# Patient Record
Sex: FEMALE | Race: BLACK OR AFRICAN AMERICAN | Employment: OTHER | URBAN - METROPOLITAN AREA
[De-identification: names, ages, dates, MRNs, and addresses within clinical notes are randomized per-mention and may not be internally consistent; named-entity substitution may affect disease eponyms.]

---

## 2019-12-22 ENCOUNTER — APPOINTMENT (OUTPATIENT)
Dept: GENERAL RADIOLOGY | Age: 69
DRG: 060 | End: 2019-12-22
Payer: MEDICARE

## 2019-12-22 ENCOUNTER — HOSPITAL ENCOUNTER (INPATIENT)
Age: 69
LOS: 3 days | Discharge: HOME HEALTH | DRG: 060 | End: 2019-12-25
Attending: EMERGENCY MEDICINE | Admitting: INTERNAL MEDICINE
Payer: MEDICARE

## 2019-12-22 PROBLEM — R29.898 BILATERAL LEG WEAKNESS: Status: ACTIVE | Noted: 2019-12-22

## 2019-12-22 LAB
A/G RATIO: 1.2 (ref 1.1–2.2)
ALBUMIN SERPL-MCNC: 4 G/DL (ref 3.4–5)
ALP BLD-CCNC: 80 U/L (ref 40–129)
ALT SERPL-CCNC: 10 U/L (ref 10–40)
ANION GAP SERPL CALCULATED.3IONS-SCNC: 15 MMOL/L (ref 3–16)
AST SERPL-CCNC: 19 U/L (ref 15–37)
BACTERIA: ABNORMAL /HPF
BASOPHILS ABSOLUTE: 0.1 K/UL (ref 0–0.2)
BASOPHILS RELATIVE PERCENT: 0.9 %
BILIRUB SERPL-MCNC: 0.4 MG/DL (ref 0–1)
BILIRUBIN URINE: NEGATIVE
BLOOD, URINE: ABNORMAL
BUN BLDV-MCNC: 14 MG/DL (ref 7–20)
CALCIUM SERPL-MCNC: 9 MG/DL (ref 8.3–10.6)
CHLORIDE BLD-SCNC: 101 MMOL/L (ref 99–110)
CLARITY: ABNORMAL
CO2: 24 MMOL/L (ref 21–32)
COLOR: YELLOW
CREAT SERPL-MCNC: 1 MG/DL (ref 0.6–1.2)
EOSINOPHILS ABSOLUTE: 0.1 K/UL (ref 0–0.6)
EOSINOPHILS RELATIVE PERCENT: 1.4 %
EPITHELIAL CELLS, UA: 3 /HPF (ref 0–5)
GFR AFRICAN AMERICAN: >60
GFR NON-AFRICAN AMERICAN: 55
GLOBULIN: 3.3 G/DL
GLUCOSE BLD-MCNC: 88 MG/DL (ref 70–99)
GLUCOSE URINE: NEGATIVE MG/DL
HCT VFR BLD CALC: 32.4 % (ref 36–48)
HEMOGLOBIN: 10.7 G/DL (ref 12–16)
HYALINE CASTS: 0 /LPF (ref 0–8)
KETONES, URINE: NEGATIVE MG/DL
LEUKOCYTE ESTERASE, URINE: ABNORMAL
LYMPHOCYTES ABSOLUTE: 2.3 K/UL (ref 1–5.1)
LYMPHOCYTES RELATIVE PERCENT: 30.6 %
MCH RBC QN AUTO: 28.8 PG (ref 26–34)
MCHC RBC AUTO-ENTMCNC: 32.9 G/DL (ref 31–36)
MCV RBC AUTO: 87.6 FL (ref 80–100)
MICROSCOPIC EXAMINATION: YES
MONOCYTES ABSOLUTE: 0.5 K/UL (ref 0–1.3)
MONOCYTES RELATIVE PERCENT: 7.3 %
NEUTROPHILS ABSOLUTE: 4.4 K/UL (ref 1.7–7.7)
NEUTROPHILS RELATIVE PERCENT: 59.8 %
NITRITE, URINE: POSITIVE
PDW BLD-RTO: 14.8 % (ref 12.4–15.4)
PH UA: 5.5 (ref 5–8)
PLATELET # BLD: 211 K/UL (ref 135–450)
PMV BLD AUTO: 8 FL (ref 5–10.5)
POTASSIUM REFLEX MAGNESIUM: 4.7 MMOL/L (ref 3.5–5.1)
PROTEIN UA: NEGATIVE MG/DL
RBC # BLD: 3.7 M/UL (ref 4–5.2)
RBC UA: 2 /HPF (ref 0–4)
SODIUM BLD-SCNC: 140 MMOL/L (ref 136–145)
SPECIFIC GRAVITY UA: 1.02 (ref 1–1.03)
TOTAL PROTEIN: 7.3 G/DL (ref 6.4–8.2)
TROPONIN: <0.01 NG/ML
TSH REFLEX: 1.21 UIU/ML (ref 0.27–4.2)
URINE REFLEX TO CULTURE: YES
URINE TYPE: ABNORMAL
UROBILINOGEN, URINE: 0.2 E.U./DL
WBC # BLD: 7.4 K/UL (ref 4–11)
WBC UA: 34 /HPF (ref 0–5)

## 2019-12-22 PROCEDURE — 2580000003 HC RX 258: Performed by: INTERNAL MEDICINE

## 2019-12-22 PROCEDURE — 71046 X-RAY EXAM CHEST 2 VIEWS: CPT

## 2019-12-22 PROCEDURE — 81001 URINALYSIS AUTO W/SCOPE: CPT

## 2019-12-22 PROCEDURE — 87086 URINE CULTURE/COLONY COUNT: CPT

## 2019-12-22 PROCEDURE — 1200000000 HC SEMI PRIVATE

## 2019-12-22 PROCEDURE — 80053 COMPREHEN METABOLIC PANEL: CPT

## 2019-12-22 PROCEDURE — 6360000002 HC RX W HCPCS: Performed by: INTERNAL MEDICINE

## 2019-12-22 PROCEDURE — 99285 EMERGENCY DEPT VISIT HI MDM: CPT

## 2019-12-22 PROCEDURE — 96374 THER/PROPH/DIAG INJ IV PUSH: CPT

## 2019-12-22 PROCEDURE — 84443 ASSAY THYROID STIM HORMONE: CPT

## 2019-12-22 PROCEDURE — 85025 COMPLETE CBC W/AUTO DIFF WBC: CPT

## 2019-12-22 PROCEDURE — 6360000002 HC RX W HCPCS: Performed by: EMERGENCY MEDICINE

## 2019-12-22 PROCEDURE — 84484 ASSAY OF TROPONIN QUANT: CPT

## 2019-12-22 PROCEDURE — 93005 ELECTROCARDIOGRAM TRACING: CPT | Performed by: EMERGENCY MEDICINE

## 2019-12-22 PROCEDURE — 6370000000 HC RX 637 (ALT 250 FOR IP): Performed by: INTERNAL MEDICINE

## 2019-12-22 RX ORDER — LISINOPRIL AND HYDROCHLOROTHIAZIDE 20; 12.5 MG/1; MG/1
1 TABLET ORAL DAILY
COMMUNITY

## 2019-12-22 RX ORDER — ACETAMINOPHEN 325 MG/1
650 TABLET ORAL EVERY 4 HOURS PRN
Status: DISCONTINUED | OUTPATIENT
Start: 2019-12-22 | End: 2019-12-25 | Stop reason: HOSPADM

## 2019-12-22 RX ORDER — AMLODIPINE BESYLATE 5 MG/1
5 TABLET ORAL DAILY
COMMUNITY
End: 2020-07-27

## 2019-12-22 RX ORDER — FLUOXETINE HYDROCHLORIDE 20 MG/1
20 CAPSULE ORAL DAILY
COMMUNITY

## 2019-12-22 RX ORDER — LISINOPRIL 5 MG/1
5 TABLET ORAL DAILY
Status: DISCONTINUED | OUTPATIENT
Start: 2019-12-23 | End: 2019-12-22

## 2019-12-22 RX ORDER — BACLOFEN 10 MG/1
10 TABLET ORAL 3 TIMES DAILY PRN
Status: DISCONTINUED | OUTPATIENT
Start: 2019-12-22 | End: 2019-12-25 | Stop reason: HOSPADM

## 2019-12-22 RX ORDER — SODIUM CHLORIDE 0.9 % (FLUSH) 0.9 %
10 SYRINGE (ML) INJECTION EVERY 12 HOURS SCHEDULED
Status: DISCONTINUED | OUTPATIENT
Start: 2019-12-22 | End: 2019-12-25 | Stop reason: HOSPADM

## 2019-12-22 RX ORDER — HYDROCODONE BITARTRATE AND ACETAMINOPHEN 5; 325 MG/1; MG/1
1 TABLET ORAL EVERY 6 HOURS PRN
Status: DISCONTINUED | OUTPATIENT
Start: 2019-12-22 | End: 2019-12-22

## 2019-12-22 RX ORDER — ONDANSETRON 2 MG/ML
4 INJECTION INTRAMUSCULAR; INTRAVENOUS EVERY 6 HOURS PRN
Status: DISCONTINUED | OUTPATIENT
Start: 2019-12-22 | End: 2019-12-25 | Stop reason: HOSPADM

## 2019-12-22 RX ORDER — METHYLPREDNISOLONE SODIUM SUCCINATE 125 MG/2ML
125 INJECTION, POWDER, LYOPHILIZED, FOR SOLUTION INTRAMUSCULAR; INTRAVENOUS ONCE
Status: COMPLETED | OUTPATIENT
Start: 2019-12-22 | End: 2019-12-22

## 2019-12-22 RX ORDER — PRAVASTATIN SODIUM 40 MG
40 TABLET ORAL DAILY
COMMUNITY

## 2019-12-22 RX ORDER — BACLOFEN 10 MG/1
10 TABLET ORAL DAILY
Status: DISCONTINUED | OUTPATIENT
Start: 2019-12-23 | End: 2019-12-22

## 2019-12-22 RX ORDER — DICLOFENAC SODIUM 75 MG/1
75 TABLET, DELAYED RELEASE ORAL 2 TIMES DAILY
Status: DISCONTINUED | OUTPATIENT
Start: 2019-12-22 | End: 2019-12-22

## 2019-12-22 RX ORDER — SODIUM CHLORIDE 0.9 % (FLUSH) 0.9 %
10 SYRINGE (ML) INJECTION PRN
Status: DISCONTINUED | OUTPATIENT
Start: 2019-12-22 | End: 2019-12-25 | Stop reason: HOSPADM

## 2019-12-22 RX ORDER — LISINOPRIL AND HYDROCHLOROTHIAZIDE 20; 12.5 MG/1; MG/1
1 TABLET ORAL DAILY
Status: DISCONTINUED | OUTPATIENT
Start: 2019-12-22 | End: 2019-12-25 | Stop reason: HOSPADM

## 2019-12-22 RX ADMIN — LISINOPRIL AND HYDROCHLOROTHIAZIDE 1 TABLET: 12.5; 2 TABLET ORAL at 22:04

## 2019-12-22 RX ADMIN — METHYLPREDNISOLONE SODIUM SUCCINATE 125 MG: 125 INJECTION, POWDER, FOR SOLUTION INTRAMUSCULAR; INTRAVENOUS at 18:01

## 2019-12-22 RX ADMIN — SODIUM CHLORIDE 375 MG: 9 INJECTION, SOLUTION INTRAVENOUS at 22:04

## 2019-12-22 RX ADMIN — Medication 10 ML: at 22:05

## 2019-12-22 RX ADMIN — CEFTRIAXONE 1 G: 1 INJECTION, POWDER, FOR SOLUTION INTRAMUSCULAR; INTRAVENOUS at 22:30

## 2019-12-22 RX ADMIN — ENOXAPARIN SODIUM 40 MG: 40 INJECTION SUBCUTANEOUS at 22:04

## 2019-12-22 ASSESSMENT — ENCOUNTER SYMPTOMS
SORE THROAT: 0
RHINORRHEA: 0
SHORTNESS OF BREATH: 0
ABDOMINAL PAIN: 0
EYE REDNESS: 0

## 2019-12-22 ASSESSMENT — PAIN SCALES - GENERAL: PAINLEVEL_OUTOF10: 0

## 2019-12-22 NOTE — ED PROVIDER NOTES
[facility]  eMERGENCY dEPARTMENT eNCOUnter      Pt Name: Jesse Rick  MRN: [id number]  [place] 1950  Date of evaluation: 12/22/2019  Provider: Merlin Barrette, MD    CHIEF COMPLAINT       Chief Complaint   Patient presents with    Extremity Weakness     states she has had bilateral weakness since last week getting worse since friday         HISTORY OF PRESENT ILLNESS   (Location/Symptom, Timing/Onset,Context/Setting, Quality, Duration, Modifying Factors, Severity)  Note limiting factors. Jesse Rick is a 77 y.o. female who presents to the emergency department complaint of bilateral lower extremities weakness that has been worsening for the past 9 days. She has a past medical history of multiple sclerosis. She reports that she really does not have anyone who manages that and she is not on any medications for it. She denies any fever or chills. No history of trauma. No history of pain. No history of chest pain or shortness of breath. No history of blood in her stool. No history of dehydration. No history of UTI symptoms. She states she just been getting more weak and now she can only walk a few feet before she has to stop because she just does not have the strength to walk. HPI    NursingNotes were reviewed. REVIEW OF SYSTEMS    (2-9 systems for level 4, 10 or more for level 5)     Review of Systems   Constitutional: Positive for fatigue. Negative for fever. HENT: Negative for rhinorrhea and sore throat. Eyes: Negative for redness. Respiratory: Negative for shortness of breath. Cardiovascular: Negative for chest pain. Gastrointestinal: Negative for abdominal pain. Genitourinary: Negative for flank pain. Musculoskeletal: Negative for joint swelling. Skin: Negative for rash. Neurological: Negative for headaches. Hematological: Negative for adenopathy. Psychiatric/Behavioral: Negative for confusion.        Except as of club or organization: None     Attends meetings of clubs or organizations: None     Relationship status: None    Intimate partner violence:     Fear of current or ex partner: None     Emotionally abused: None     Physically abused: None     Forced sexual activity: None   Other Topics Concern    None   Social History Narrative    None       SCREENINGS             PHYSICAL EXAM    (up to 7 for level 4, 8 or more for level 5)     ED Triage Vitals [12/22/19 1503]   BP Temp Temp Source Pulse Resp SpO2 Height Weight   (!) 146/85 97.4 °F (36.3 °C) Oral 59 18 95 % 5' 4.5\" (1.638 m) 220 lb 3.8 oz (99.9 kg)       Physical Exam  Vitals signs and nursing note reviewed. Constitutional:       Appearance: She is well-developed. She is not diaphoretic. HENT:      Head: Normocephalic and atraumatic. Eyes:      General:         Right eye: No discharge. Left eye: No discharge. Conjunctiva/sclera: Conjunctivae normal.   Neck:      Musculoskeletal: Neck supple. Cardiovascular:      Rate and Rhythm: Normal rate. Pulses: Normal pulses. Heart sounds: No murmur. Pulmonary:      Effort: Pulmonary effort is normal. No respiratory distress. Breath sounds: Normal breath sounds. No stridor. No wheezing. Abdominal:      General: Abdomen is flat. There is no distension. Palpations: Abdomen is soft. Tenderness: There is no tenderness. Musculoskeletal: Normal range of motion. Skin:     General: Skin is warm and dry. Capillary Refill: Capillary refill takes less than 2 seconds. Neurological:      Mental Status: She is alert and oriented to person, place, and time. GCS: GCS eye subscore is 4. GCS verbal subscore is 5. GCS motor subscore is 6. Comments: Normal speech. Normal thought. Oriented x3.    Psychiatric:         Behavior: Behavior normal.         DIAGNOSTIC RESULTS     EKG: All EKG's are interpreted by the Emergency Department Physician who either signs or Co-signsthis chart in the absence of a cardiologist.    The Ekg interpreted by me shows  sinus bradycardia, rate=57   Axis is   48  QTc is  normal  Intervals and Durations are unremarkable. ST Segments: nonspecific changes  No significant change from prior EKG dated 5/13/14          RADIOLOGY:   Non-plain filmimages such as CT, Ultrasound and MRI are read by the radiologist. Plain radiographic images are visualized and preliminarily interpreted by the emergency physician with the below findings:        Interpretation per the Radiologist below, if available at the time ofthis note:    XR CHEST STANDARD (2 VW)   Final Result   No acute abnormality identified. ED BEDSIDE ULTRASOUND:   Performed by ED Physician - none    LABS:  Labs Reviewed   CBC WITH AUTO DIFFERENTIAL - Abnormal; Notable for the following components:       Result Value    RBC 3.70 (*)     Hemoglobin 10.7 (*)     Hematocrit 32.4 (*)     All other components within normal limits    Narrative:     Performed at:  [facility]  [address], [place] [address]   Phone [phone]   COMPREHENSIVE METABOLIC PANEL W/ REFLEX TO MG FOR LOW K - Abnormal; Notable for the following components:    GFR Non- 55 (*)     All other components within normal limits    Narrative:     Performed at:  [facility]  [address], [place] [address]   Phone [phone]   TROPONIN    Narrative:     Performed at:  [facility]  [address], [place] [address]   Phone [phone]   TSH WITH REFLEX    Narrative:     Performed at:  [facility]  [address], [place] [facility] [employer] [place] [address]   Phone [phone]   URINE RT REFLEX TO CULTURE       All other labs were within normal range or not returned as of this dictation.     EMERGENCY DEPARTMENT COURSE and DIFFERENTIAL DIAGNOSIS/MDM:

## 2019-12-22 NOTE — H&P
Hospital Medicine History and Physical    12/22/2019    Date of Admission: 12/22/2019    Date of Service: Pt seen/examined on 12/22/2019 and admitted to inpatient. Assessment:  1. Gait difficulty/difficulty ambulation and patient with history of MS.  2. Abnormal urinalysis, although no symptoms. R/o urinary tract infection. 3. Other comorbidities: Essential hypertension. Plan:  1. Receiving total Solu-Medrol 500 mg IV in the emergency room. 2. Start rocephin. Follow urine culture result. Await neuro eval.  3. Will obtain MRI-brain w/ and w/o contrastg. PT/OT. Fall precautions. Activities: Up with assist  Prophylaxis: Subcutaneous Lovenox  Code status: Full code    ==========================================================  Chief complaint:  Chief Complaint   Patient presents with    Extremity Weakness     states she has had bilateral weakness since last week getting worse since friday     History of Presenting Illness: This is a pleasant 77 y.o. female with history of MS, essential hypertension, who presents to the emergency room with report of bilateral lower extremity weakness that has been ongoing for the past week, worsened over the last couple of days. No clear urinary symptoms or fever or chills or chest pain. She reports right great toe not moving as much. She does not seem to have any focal deficits otherwise. Past Medical History:      Diagnosis Date    Hypertension     MS (multiple sclerosis) ([facility] [address]        Past Surgical History:      Procedure Laterality Date    APPENDECTOMY      CHOLECYSTECTOMY      HYSTERECTOMY      KNEE SURGERY      left    LUMBAR FUSION         Medications (prior to admission):  Prior to Admission medications    Medication Sig Start Date End Date Taking? Authorizing Provider   HYDROcodone-acetaminophen (NORCO) 5-325 MG per tablet Take 1 tablet by mouth every 6 hours as needed for Pain.  2/15/15   JAX Carr   diclofenac (VOLTAREN) 75 MG EC tablet Take 1 tablet by mouth 2 times daily. 8/27/14   Freda Palmer MD   baclofen (LIORESAL) 10 MG tablet Take 10 mg by mouth daily. Historical Provider, MD   lisinopril (PRINIVIL) 10 MG tablet Take 0.5 tablets by mouth daily. 5/14/14   Pierce Jain MD   ibuprofen (ADVIL) 200 MG tablet Take 1 tablet by mouth every 8 hours as needed for Pain (Headache). 5/14/14   Pierce Jain MD       Allergy(ies):  Patient has no known allergies. Social History:  TOBACCO:  reports that she has quit smoking. Her smoking use included cigarettes. She smoked 0.14 packs per day. She has never used smokeless tobacco.  ETOH:  reports current alcohol use. Family History:  History reviewed. No pertinent family history. Review of Systems:  Pertinent positives are listed in HPI. At least 10-point ROS reviewed and were negative. Vitals and physical examination:  BP (!) 146/85   Pulse 59   Temp 97.4 °F (36.3 °C) (Oral)   Resp 18   Ht 5' 4.5\" (1.638 m)   Wt 220 lb 3.8 oz (99.9 kg)   SpO2 95%   BMI 37.22 kg/m²   Gen/overall appearance: Not in acute distress. Alert. Head: Normocephalic, atraumatic  Eyes: EOMI, good acuity  ENT: Oral mucosa moist  Neck: No JVD, thyromegaly  CVS: Nml S1S2, no MRG, RRR  Pulm: Clear bilaterally. No crackles/wheezes  Gastrointestinal: Soft, NT/ND, +BS  Musculoskeletal: No edema. Warm  Neuro: No focal deficit. Moves extremity spontaneously. Psychiatry: Appropriate affect. Not agitated. Skin: Warm, dry with normal turgor.  No rash  Capillary refill: Brisk,< 3 seconds   Peripheral Pulses: +2 palpable, equal bilaterally      Labs/imaging/EKG:  CBC:   Recent Labs     12/22/19  1546   WBC 7.4   HGB 10.7*        BMP:    Recent Labs     12/22/19  1546      K 4.7      CO2 24   BUN 14   CREATININE 1.0   GLUCOSE 88     Hepatic:   Recent Labs     12/22/19  1546   AST 19   ALT 10   BILITOT 0.4   ALKPHOS 80       Xr Chest Standard (2 Vw)    Result Date: 12/22/2019  EXAMINATION: TWO XRAY VIEWS OF THE CHEST 12/22/2019 12:12 pm COMPARISON: 05/13/2014 HISTORY: ORDERING SYSTEM PROVIDED HISTORY: weakness TECHNOLOGIST PROVIDED HISTORY: Reason for exam:->weakness Reason for Exam: weakness Acuity: Acute Type of Exam: Initial Additional signs and symptoms: cannot walk in a straight line for 10 days FINDINGS: Cardial pericardial silhouette is stable. No acute focal infiltrate is identified. There is evidence of old granulomatous disease, stable. No pneumothorax is found. No free air. No acute bony abnormality. No acute abnormality identified. EKG: Sinus bradycardia, 57 bpm.  No acute ST/T changes. I reviewed EKG. Discussed with ER provider.       Thank you Shirley Keller for the opportunity to be involved in this patient's care.    -----------------------------  Kameron Greenfield MD  [facility] hospitalist

## 2019-12-23 ENCOUNTER — APPOINTMENT (OUTPATIENT)
Dept: MRI IMAGING | Age: 69
DRG: 060 | End: 2019-12-23
Payer: MEDICARE

## 2019-12-23 LAB
A/G RATIO: 1.4 (ref 1.1–2.2)
ALBUMIN SERPL-MCNC: 4.2 G/DL (ref 3.4–5)
ALP BLD-CCNC: 84 U/L (ref 40–129)
ALT SERPL-CCNC: 7 U/L (ref 10–40)
ANION GAP SERPL CALCULATED.3IONS-SCNC: 18 MMOL/L (ref 3–16)
AST SERPL-CCNC: 11 U/L (ref 15–37)
BASOPHILS ABSOLUTE: 0 K/UL (ref 0–0.2)
BASOPHILS RELATIVE PERCENT: 0.1 %
BILIRUB SERPL-MCNC: 0.3 MG/DL (ref 0–1)
BUN BLDV-MCNC: 17 MG/DL (ref 7–20)
CALCIUM SERPL-MCNC: 9.5 MG/DL (ref 8.3–10.6)
CHLORIDE BLD-SCNC: 100 MMOL/L (ref 99–110)
CO2: 20 MMOL/L (ref 21–32)
CREAT SERPL-MCNC: 0.9 MG/DL (ref 0.6–1.2)
EKG ATRIAL RATE: 57 BPM
EKG DIAGNOSIS: NORMAL
EKG P AXIS: 48 DEGREES
EKG P-R INTERVAL: 156 MS
EKG Q-T INTERVAL: 448 MS
EKG QRS DURATION: 90 MS
EKG QTC CALCULATION (BAZETT): 436 MS
EKG R AXIS: 9 DEGREES
EKG T AXIS: 36 DEGREES
EKG VENTRICULAR RATE: 57 BPM
EOSINOPHILS ABSOLUTE: 0 K/UL (ref 0–0.6)
EOSINOPHILS RELATIVE PERCENT: 0 %
GFR AFRICAN AMERICAN: >60
GFR NON-AFRICAN AMERICAN: >60
GLOBULIN: 3.1 G/DL
GLUCOSE BLD-MCNC: 135 MG/DL (ref 70–99)
HCT VFR BLD CALC: 39.5 % (ref 36–48)
HEMOGLOBIN: 12.8 G/DL (ref 12–16)
LYMPHOCYTES ABSOLUTE: 1 K/UL (ref 1–5.1)
LYMPHOCYTES RELATIVE PERCENT: 19.1 %
MCH RBC QN AUTO: 28.7 PG (ref 26–34)
MCHC RBC AUTO-ENTMCNC: 32.3 G/DL (ref 31–36)
MCV RBC AUTO: 88.9 FL (ref 80–100)
MONOCYTES ABSOLUTE: 0 K/UL (ref 0–1.3)
MONOCYTES RELATIVE PERCENT: 0.8 %
NEUTROPHILS ABSOLUTE: 4.4 K/UL (ref 1.7–7.7)
NEUTROPHILS RELATIVE PERCENT: 80 %
PDW BLD-RTO: 14.6 % (ref 12.4–15.4)
PLATELET # BLD: 219 K/UL (ref 135–450)
PMV BLD AUTO: 7.8 FL (ref 5–10.5)
POTASSIUM REFLEX MAGNESIUM: 3.7 MMOL/L (ref 3.5–5.1)
RBC # BLD: 4.44 M/UL (ref 4–5.2)
SODIUM BLD-SCNC: 138 MMOL/L (ref 136–145)
TOTAL PROTEIN: 7.3 G/DL (ref 6.4–8.2)
WBC # BLD: 5.4 K/UL (ref 4–11)

## 2019-12-23 PROCEDURE — 97530 THERAPEUTIC ACTIVITIES: CPT

## 2019-12-23 PROCEDURE — 6370000000 HC RX 637 (ALT 250 FOR IP): Performed by: INTERNAL MEDICINE

## 2019-12-23 PROCEDURE — 85025 COMPLETE CBC W/AUTO DIFF WBC: CPT

## 2019-12-23 PROCEDURE — 6360000002 HC RX W HCPCS: Performed by: INTERNAL MEDICINE

## 2019-12-23 PROCEDURE — 36415 COLL VENOUS BLD VENIPUNCTURE: CPT

## 2019-12-23 PROCEDURE — 80053 COMPREHEN METABOLIC PANEL: CPT

## 2019-12-23 PROCEDURE — 97116 GAIT TRAINING THERAPY: CPT

## 2019-12-23 PROCEDURE — 72156 MRI NECK SPINE W/O & W/DYE: CPT

## 2019-12-23 PROCEDURE — 70553 MRI BRAIN STEM W/O & W/DYE: CPT

## 2019-12-23 PROCEDURE — 6360000002 HC RX W HCPCS: Performed by: NURSE PRACTITIONER

## 2019-12-23 PROCEDURE — 94760 N-INVAS EAR/PLS OXIMETRY 1: CPT

## 2019-12-23 PROCEDURE — 97166 OT EVAL MOD COMPLEX 45 MIN: CPT

## 2019-12-23 PROCEDURE — 6360000004 HC RX CONTRAST MEDICATION: Performed by: NURSE PRACTITIONER

## 2019-12-23 PROCEDURE — A9577 INJ MULTIHANCE: HCPCS | Performed by: NURSE PRACTITIONER

## 2019-12-23 PROCEDURE — 1200000000 HC SEMI PRIVATE

## 2019-12-23 PROCEDURE — 2580000003 HC RX 258: Performed by: INTERNAL MEDICINE

## 2019-12-23 PROCEDURE — 72157 MRI CHEST SPINE W/O & W/DYE: CPT

## 2019-12-23 PROCEDURE — 93010 ELECTROCARDIOGRAM REPORT: CPT | Performed by: INTERNAL MEDICINE

## 2019-12-23 PROCEDURE — 97162 PT EVAL MOD COMPLEX 30 MIN: CPT

## 2019-12-23 PROCEDURE — 97535 SELF CARE MNGMENT TRAINING: CPT

## 2019-12-23 RX ORDER — LORAZEPAM 2 MG/ML
0.5 INJECTION INTRAMUSCULAR ONCE
Status: COMPLETED | OUTPATIENT
Start: 2019-12-23 | End: 2019-12-23

## 2019-12-23 RX ADMIN — GADOBENATE DIMEGLUMINE 20 ML: 529 INJECTION, SOLUTION INTRAVENOUS at 20:02

## 2019-12-23 RX ADMIN — ENOXAPARIN SODIUM 40 MG: 40 INJECTION SUBCUTANEOUS at 21:46

## 2019-12-23 RX ADMIN — LISINOPRIL AND HYDROCHLOROTHIAZIDE 1 TABLET: 12.5; 2 TABLET ORAL at 08:18

## 2019-12-23 RX ADMIN — CEFTRIAXONE 1 G: 1 INJECTION, POWDER, FOR SOLUTION INTRAMUSCULAR; INTRAVENOUS at 21:46

## 2019-12-23 RX ADMIN — LORAZEPAM 0.5 MG: 2 INJECTION INTRAMUSCULAR; INTRAVENOUS at 17:28

## 2019-12-23 RX ADMIN — Medication 10 ML: at 21:47

## 2019-12-23 RX ADMIN — Medication 10 ML: at 08:18

## 2019-12-23 ASSESSMENT — PAIN SCALES - GENERAL
PAINLEVEL_OUTOF10: 0

## 2019-12-23 NOTE — PROGRESS NOTES
opposition to each digit, and rapid alternating movements. Bed mobility  Comment: NT-pt OOB to recliner at start/end of session      Transfers  Sit to stand: Contact guard assistance  Stand to sit: Contact guard assistance     Vision - Basic Assessment  Prior Vision: Wears glasses only for reading  Vision Comments: per pt report may have glaucoma     Cognition  Overall Cognitive Status: WFL     Sensation  Overall Sensation Status: Impaired  Additional Comments: the pt reports fingertips of R hand are tingly and she cannot feel her R foot     LUE AROM (degrees)  LUE AROM : WFL  RUE AROM (degrees)  RUE AROM : WFL  RUE General AROM: shoulder flexion WFL but lacks end range, WFL distally     LUE Strength  Gross LUE Strength: WFL  RUE Strength  Gross RUE Strength: Exceptions to [facility]  R Shoulder Flex: 3+/5  R Elbow Flex: 4-/5  R Hand General: 4-/5                   Plan   Plan  Times per week: 3-5  Times per day: Daily  Current Treatment Recommendations: Balance Training, Functional Mobility Training, Endurance Training, Strengthening, ROM, Neuromuscular Re-education, Self-Care / ADL, Safety Education & Training      AM-PAC Score        -[facility] Inpatient Daily Activity Raw Score: 18 (12/23/19 1055)  AM-PAC Inpatient ADL T-Scale Score : 38.66 (12/23/19 1055)  ADL Inpatient CMS 0-100% Score: 46.65 (12/23/19 1055)  ADL Inpatient CMS G-Code Modifier : CK (12/23/19 1055)    Goals  Short term goals  Time Frame for Short term goals: Prior to d/c:  Short term goal 1: Pt will bathe with supervision. Short term goal 2: Pt will dress with supervision. Short term goal 3: Pt will toilet with supervision. Short term goal 4: Pt will complete fxl mobility and fxl transfers to/from ADL surfaces with supervision using AD. Short term goal 5: Pt will tolerate standing >5 minutes for functional task with supervision. Long term goals  Time Frame for Long term goals : STG=LTG  Patient Goals   Patient goals : to return home. Therapy Time   Individual Concurrent Group Co-treatment   Time In 1005         Time Out 1050         Minutes 45         Timed Code Treatment Minutes: 30 Minutes     This note to serve as OT d/c summary if pt is d/c-ed prior to next therapy session.     Kristel Harley, OTR/L [id number]

## 2019-12-23 NOTE — PROGRESS NOTES
Pharmacy Medication Reconciliation Note     List of medications patient is currently taking is complete. Source of information:   1. Conversation with patient at bedside. 2. Patient provided medication bottles. 3. Epic records. ALLERGY  Patient has no known allergies. Notes regarding home medications:   1. Patient reports last taking her regular medications yesterday. 2. Patient denies any herbal or OTC medications.      Velia Dior, Pharmacy Intern  12/22/2019  7:10 PM

## 2019-12-23 NOTE — PLAN OF CARE
Problem: Falls - Risk of:  Goal: Will remain free from falls  Description  Will remain free from falls  12/23/2019 1144 by Basilia Maciel RN  Outcome: Ongoing  Note:   Pt free from falls this shift. Non skid socks on. X 2 bedside rails up, bed /chair alarm used. Call light always within reach. Pt able and agreeable to contact for safety appropriately. Problem: Falls - Risk of:  Goal: Absence of physical injury  Description  Absence of physical injury  12/23/2019 1144 by Basilia Maciel RN  Outcome: Ongoing     Problem: SAFETY  Goal: Free from accidental physical injury  12/23/2019 1144 by Basilia Maciel RN  Outcome: Ongoing  Note:   Patient remains free from accidental injury. Precautions taken to ensure safe environment including bed in lowest position, wheels locked, and call light within reach. Measures taken to prevent accidental needle sticks and proper patient Identification. Problem: DAILY CARE  Goal: Daily care needs are met  12/23/2019 1144 by Basilia Maciel RN  Outcome: Ongoing     Problem: SKIN INTEGRITY  Goal: Skin integrity is maintained or improved  12/23/2019 1144 by Basilia Maciel RN  Outcome: Ongoing  Note:   Skin assessment performed each shift per protocol. Pt encouraged to reposition often. Will continue to monitor and assess for skin breakdown.         Problem: DISCHARGE BARRIERS  Goal: Patient's continuum of care needs are met  12/23/2019 1144 by Basilia Maciel RN  Outcome: Ongoing

## 2019-12-24 LAB — URINE CULTURE, ROUTINE: NORMAL

## 2019-12-24 PROCEDURE — 6370000000 HC RX 637 (ALT 250 FOR IP): Performed by: INTERNAL MEDICINE

## 2019-12-24 PROCEDURE — 2580000003 HC RX 258: Performed by: INTERNAL MEDICINE

## 2019-12-24 PROCEDURE — 1200000000 HC SEMI PRIVATE

## 2019-12-24 PROCEDURE — 6360000002 HC RX W HCPCS: Performed by: INTERNAL MEDICINE

## 2019-12-24 RX ADMIN — LISINOPRIL AND HYDROCHLOROTHIAZIDE 1 TABLET: 12.5; 2 TABLET ORAL at 08:42

## 2019-12-24 RX ADMIN — CEFTRIAXONE 1 G: 1 INJECTION, POWDER, FOR SOLUTION INTRAMUSCULAR; INTRAVENOUS at 20:28

## 2019-12-24 RX ADMIN — Medication 10 ML: at 20:29

## 2019-12-24 RX ADMIN — Medication 10 ML: at 08:42

## 2019-12-24 RX ADMIN — ENOXAPARIN SODIUM 40 MG: 40 INJECTION SUBCUTANEOUS at 20:28

## 2019-12-24 ASSESSMENT — PAIN SCALES - GENERAL
PAINLEVEL_OUTOF10: 0

## 2019-12-24 NOTE — PROGRESS NOTES
12/22/2019    SPECGRAV 1.020 12/22/2019    GLUCOSEU Negative 12/22/2019       Radiology:  MRI CERVICAL SPINE W WO CONTRAST   Final Result   Brain findings consistent with a history of multiple sclerosis, moderately   progressed from the previous examination of May 13, 2014. A few areas of active demyelination within the deep white matter of both   cerebral hemispheres as well as within the cerebellum. No cord abnormality detected. MRI BRAIN W WO CONTRAST   Final Result   Brain findings consistent with a history of multiple sclerosis, moderately   progressed from the previous examination of May 13, 2014. A few areas of active demyelination within the deep white matter of both   cerebral hemispheres as well as within the cerebellum. No cord abnormality detected. MRI THORACIC SPINE W WO CONTRAST   Final Result   Brain findings consistent with a history of multiple sclerosis, moderately   progressed from the previous examination of May 13, 2014. A few areas of active demyelination within the deep white matter of both   cerebral hemispheres as well as within the cerebellum. No cord abnormality detected. XR CHEST STANDARD (2 VW)   Final Result   No acute abnormality identified.                  Assessment/Plan:    Active Hospital Problems    Diagnosis    Bilateral leg weakness [R29.898]       Multiple sclerosis - MRI reviewed, presented with B/l LE weakness - neurology consulted, will need outpt f/u with neurology  Suspected UTI - urine cx neg, but clinically weakness improved, complete 3 days of rocephin  HTN - controlled, cont home meds       Diet: DIET GENERAL;  Code Status: Full Code    PT/OT Eval Status: ordered    Dispo - cont care, poss d/c in am    Brennan Marcum MD

## 2019-12-24 NOTE — PROGRESS NOTES
Pt A&O x4. VSS. Pt denies any pain or discomfort. Pt states her legs doesn't weak anymore. Pt up to the bathroom with walker-SBA. Pt now sitting up in the chair. Eating breakfast. Scheduled morning meds given to the pt. Pt denies other needs at present time. Call light and item need in reach. Electronically signed by Andie Da Silva RN on 12/24/2019 at 8:46 AM

## 2019-12-25 VITALS
WEIGHT: 222 LBS | BODY MASS INDEX: 36.99 KG/M2 | RESPIRATION RATE: 16 BRPM | HEIGHT: 65 IN | OXYGEN SATURATION: 98 % | TEMPERATURE: 98.3 F | SYSTOLIC BLOOD PRESSURE: 145 MMHG | HEART RATE: 54 BPM | DIASTOLIC BLOOD PRESSURE: 88 MMHG

## 2019-12-25 PROCEDURE — 2580000003 HC RX 258: Performed by: INTERNAL MEDICINE

## 2019-12-25 PROCEDURE — 6370000000 HC RX 637 (ALT 250 FOR IP): Performed by: INTERNAL MEDICINE

## 2019-12-25 PROCEDURE — 6360000002 HC RX W HCPCS: Performed by: INTERNAL MEDICINE

## 2019-12-25 PROCEDURE — 94760 N-INVAS EAR/PLS OXIMETRY 1: CPT

## 2019-12-25 RX ADMIN — LISINOPRIL AND HYDROCHLOROTHIAZIDE 1 TABLET: 12.5; 2 TABLET ORAL at 08:25

## 2019-12-25 RX ADMIN — Medication 10 ML: at 08:26

## 2019-12-25 RX ADMIN — CEFTRIAXONE 1 G: 1 INJECTION, POWDER, FOR SOLUTION INTRAMUSCULAR; INTRAVENOUS at 10:48

## 2019-12-25 NOTE — DISCHARGE INSTR - COC
Continuity of Care Form    Patient Name: Taylor Preston   :  1950  MRN:  [id number]    Admit date:  2019  Discharge date:  ***    Code Status Order: Full Code   Advance Directives:   [facility] Documentation     Date/Time Healthcare Directive Type of Healthcare Directive Copy in [address] [facility] [address] Agent's Name Healthcare Agent's Phone Number    19  Yes, patient has an advance directive for healthcare treatment  Living will  No, copy requested from family  Gabriel Dorman  --          Admitting Physician:  Rut Obrien MD  PCP: Anastacia Meyer    Discharging Nurse: [facility] Unit/Room#: J5V-9483/9199-52  Discharging Unit Phone Number: ***    Emergency Contact:   Extended Emergency Contact Information  Primary Emergency Contact: Darwin Nieto  Address: [address] Phone: [phone]  Work Phone: [phone]  Relation: Spouse  Secondary Emergency Contact: [address] Phone: [phone]  Relation: Child    Past Surgical History:  Past Surgical History:   Procedure Laterality Date    APPENDECTOMY      CHOLECYSTECTOMY      HYSTERECTOMY      KNEE SURGERY      left    LUMBAR FUSION         Immunization History: There is no immunization history on file for this patient.     Active Problems:  Patient Active Problem List   Diagnosis Code    Bilateral leg weakness R29.898       Isolation/Infection:   Isolation          No Isolation        Patient Infection Status     None to display          Nurse Assessment:  Last Vital Signs: BP (!) 145/88   Pulse 54   Temp 98.3 °F (36.8 °C) (Oral)   Resp 16   Ht 5' 4.5\" (1.638 m)   Wt 222 lb 0.1 oz (100.7 kg)   SpO2 98%   BMI 37.52 kg/m²     Last documented pain score (0-10 scale): Pain Level: 0  Last Weight:   Wt Readings from Last 1 Encounters:   19 222 lb 0.1 oz (100.7 kg)     Mental Status:  {IP PT MENTAL STATUS:}    IV Access:  { ABIEL IV ACCESS:668548584}    Nursing Mobility/ADLs:  Walking   {P DME BTBC:301611170}  Transfer  {P DME XBKI:881763189}  Bathing  {P DME FBRI:170010093}  Dressing  {P DME KMGD:101295798}  Toileting  {P DME TWYN:517210079}  Feeding  {[facility] DME DJTA:372842457}  Med Admin  {[facility] DME DZK}  Med Delivery   { ABIEL MED Delivery:475695935}    Wound Care Documentation and Therapy:        Elimination:  Continence:   · Bowel: {YES / ZT:19094}  · Bladder: {YES / IE:53238}  Urinary Catheter: {Urinary Catheter:916197283}   Colostomy/Ileostomy/Ileal Conduit: {YES / LE:48588}       Date of Last BM: ***    Intake/Output Summary (Last 24 hours) at 2019 1041  Last data filed at 2019  Gross per 24 hour   Intake 650 ml   Output --   Net 650 ml     I/O last 3 completed shifts:   In: 56 [P.O.:960; I.V.:10; IV Piggyback:50]  Out: -     Safety Concerns:     [address] [facility] Safety Concerns:579369668}    Impairments/Disabilities:      [address] [facility] Impairments/Disabilities:667061620}    Nutrition Therapy:  Current Nutrition Therapy:   [address] [facility] Diet List:399770104}    Routes of Feeding: {[facility] DME Other Feedings:619136618}  Liquids: {Slp liquid thickness:96847}  Daily Fluid Restriction: {P DME Yes amt example:549795250}  Last Modified Barium Swallow with Video (Video Swallowing Test): {Done Not Done GACZ:453269907}    Treatments at the Time of Hospital Discharge:   Respiratory Treatments: ***  Oxygen Therapy:  {Therapy; copd oxygen:22888}  Ventilator:    {[facility] Vent XHGE:235882283}    Rehab Therapies: {THERAPEUTIC INTERVENTION:3105798149}  Weight Bearing Status/Restrictions: [facility] Weight Bearin}  Other Medical Equipment (for information only, NOT a DME order):  {EQUIPMENT:247146142}  Other Treatments: ***    Patient's personal belongings (please select all that are sent with patient):  {SAY DME Belongings:665291806}    RN SIGNATURE:  {Esignature:471322499}    CASE MANAGEMENT/SOCIAL WORK SECTION    Inpatient Status Date: 12/22/2019    Readmission Risk Assessment Score:  Readmission Risk              Risk of Unplanned Readmission:        8           Discharging to Facility/ Agency   · Name: [facility]  · Address:  · Phone:  · Fax:    Dialysis Facility (if applicable)   · Name:  · Address:  · Dialysis Schedule:  · Phone:  · Fax:    / signature: Electronically signed by Abby Parish RN on 12/25/19 at 11:34 AM    PHYSICIAN SECTION    Prognosis: Fair    Condition at Discharge: Stable    Rehab Potential (if transferring to Rehab): Fair    Recommended Labs or Other Treatments After Discharge: NA    Physician Certification: I certify the above information and transfer of Tyler Kerr  is necessary for the continuing treatment of the diagnosis listed and that she requires [address] for less 30 days.      Update Admission H&P: Changes in H&P as follows - refer to d/c summary    PHYSICIAN SIGNATURE:  Electronically signed by Yannick Mir MD on 12/25/19 at 10:41 AM

## 2020-03-11 ENCOUNTER — HOSPITAL ENCOUNTER (OUTPATIENT)
Dept: OCCUPATIONAL THERAPY | Age: 70
Setting detail: THERAPIES SERIES
Discharge: HOME | End: 2020-03-11
Payer: MEDICARE

## 2020-03-11 ENCOUNTER — APPOINTMENT (OUTPATIENT)
Dept: PHYSICAL THERAPY | Age: 70
End: 2020-03-11
Payer: MEDICARE

## 2020-03-11 ENCOUNTER — HOSPITAL ENCOUNTER (OUTPATIENT)
Dept: PHYSICAL THERAPY | Age: 70
Setting detail: THERAPIES SERIES
Discharge: HOME | End: 2020-03-11
Payer: MEDICARE

## 2020-03-11 PROCEDURE — 97165 OT EVAL LOW COMPLEX 30 MIN: CPT

## 2020-03-11 PROCEDURE — 97110 THERAPEUTIC EXERCISES: CPT

## 2020-03-11 PROCEDURE — 97530 THERAPEUTIC ACTIVITIES: CPT

## 2020-03-11 PROCEDURE — 97161 PT EVAL LOW COMPLEX 20 MIN: CPT

## 2020-03-11 NOTE — PROGRESS NOTES
did not bring a device to evaluation on this date. Physical and Social Environments (which aide or inhibit performance in desired occupations):  home environment can be a barrier with 13 stairs and bedroom on the second floor     Personal, Temporal, and Virtual Contexts (which aide or inhibit performance in desired occupations): supportive family and spouse      SUBJECTIVE: Patient stated complaint: UE/LE weakness. Pt stated R side is extremely weak compared with L side.      Pain Scale: 7/10 back, previous back surgery   Easing factors: NA  Provocative factors:  NA    Type: [x]Constant   []Intermittent  []Radiating []Localized []other:       OBJECTIVE:   Hand dominance: L handed     Inspection: normal appearance     Palpation: warm to touch BUE     Bandages/Dressings/Incisions:NA     ROM WFL: []Yes [x]No (if checked, see below)     PROM AROM    L R L R   Shoulder Flexion     90   Shoulder Abduction     90   Shoulder External Rotation        Shoulder Internal Rotation        Elbow Flexion        Elbow Extension        Pronation        Supination        Wrist Flexion        Wrist Extension        Radial Deviation        Ulnar Deviation       CMC Abduction       5th Digit MCP Extension-Flexion       4th Digit MCP Extension-Flexion       3rd Digit MCP Extension-Flexion       2nd Digit MCP Extension-Flexion       1st Digit MCP Extension-Flexion       5th Digit PIP Extension- Flexion       4th Digit PIP Extension-Flexion       3rd Digit PIP Extension-Flexion       2nd Digit PIP Extension-Flexion       1st Digit IP Extension-Flexion       5th Digit DIP Extension-Flexion       4th Digit DIP Extension-Flexion       3rd Digit DIP Extension-Flexion       2nd Digit DIP Extension-Flexion       Composite Flexion (Tip of 3rd Digit to Distal Palmar Crease (cm))         Joint mobility:    [x]Normal    []Hypo   []Hyper    Strength WFL: []Yes [x]No (if checked, see below)    Strength (0-5) Left Right    Shoulder Flex 4+ 3 Therefore, the patient is in need of skilled occupational therapy services to mitigate functional deficits in order to allow the patient to return to baseline, prevent further decline, and/or compensate for decreased functional abilities.       Functional Impairments and Activity Limitations (from functional questionnaire, intake, and interview)    [x]Reduced participation in functional mobility   []Reduced cognition   [x]Reduced participation in high level IADLs   [x]Reduced participation ADLs   [x]Reduced endurance  [x]Reduced fine motor control   [x]Reduced ROM   [x]Reduced sensation   [x]Reduced coordination  [x]Reduced strength   [x]Reduced balance   []Reduced posture   []Reduced safety awareness   []Reduced functional vision      Participation Restrictions   Prognosis/Rehab Potential:      []Excellent   []Good    [x]Fair   []Poor    Tolerance of evaluation/treatment:    []Excellent   [x]Good    []Fair   []Poor    Occupational Therapy Evaluation Complexity Justification   [x] A Occupational Profile/Medical and Therapy History  [] no personal factors and/or comorbidities that impact the plan of care; brief history relating to presenting problem  [x]1-2 personal factors and/or comorbidities that impact the plan of care; additional review of physical, cognitive, or psychosocial performance  []3 personal factors and/or comorbidities that impact the plan of care; extensive review of physical cognitive, psychosocial performance  [x] Patient Assessment:  [x] a total of 1-3 performance deficits relating to physical, cognitive, psychosocial limitations/restrictions  [] a total of 3-5 performance deficits relating to physical, cognitive, psychosocial limitations/restrictions  [] a total of 5 or more performance deficits relating to physical, cognitive, psychosocial limitations/restrictions  [x] A clinical presentation with:  [x] low complexity, limited amount of treatment options no assessment modification, no 30 days  1. Pt will demonstrate improved  strength as indicated by increased  by 5lb   [] Progressing: [] Met: [] Not Met: [] Adjusted  2. Pt will demonstrate improved fine motor skills as indicated by decreased time on 9 hole peg test by 10 seconds on RUE  [] Progressing: [] Met: [] Not Met: [] Adjusted  3. Pt will demonstrate increased sensation in RUE needed for clothing management donning/doffing buttons with dressing   [] Progressing: [] Met: [] Not Met: [] Adjusted    Long Term Goals: To be achieved by john  1. Pt will will report a QuickDASH Symptom Severity Scale score of 40% or less indicating increased safety and functional independence in desired occupational pursuits by discharge. [] Progressing: [] Met: [] Not Met: [] Adjusted  Electronically signed by:  Esvin Branch, [address] [place] [address], S/OT      Occupational Therapist was present, directed the patient's care, made skilled judgement, and was responsible for assessment and treatment of the patient.

## 2020-03-11 NOTE — FLOWSHEET NOTE
[address] [facility]  Phone: [phone]   Fax: [phone]    Physical Therapy Daily Treatment Note  Date:  3/11/2020    Patient Name:  Abena Nicole    :  1950  MRN: [id number]  Medical/Treatment Diagnosis Information:  · Diagnosis: Multiple Sclerosis Z86.69  · Treatment Diagnosis: Right LE weakness, decreased hip and knee flexion and ankle DF during gait, difficulty with stairs, impaired static balance, reduced endurance, increased fall risk due to increased TUG time and impaired gait   Insurance/Certification information:  PT Insurance Information: [place] [employer]vantage HMO - auth required, $40 copay  Physician Information:  Referring Practitioner: Jefry Em MD  Plan of care signed (Y/N): []  Yes [x]  No     Date of Patient follow up with Physician:      Progress Report: []  Yes  [x]  No     Date Range for reporting period:  Beginning: 3/11  Ending:     Progress report due (10 Rx/or 30 days whichever is less): visit #45 or 3/42    Recertification due (POC duration/ or 90 days whichever is less):      Visit # Insurance Allowable Auth required?  Date Range    + auth auth required []  Yes  []  No Waiting on auth     Latex Allergy:  [x]NO      []YES  Preferred Language for Healthcare:   [x]English       []other:    Functional Scale:        Date assessed:  TUG 25.06 sec      3/12/2020    Pain level:  7/10     SUBJECTIVE:  See eval    OBJECTIVE: See eval      RESTRICTIONS/PRECAUTIONS: current smoker, pt coordinating with OT, fatigues easily     Exercises/Interventions:     Therapeutic Exercises (87724) Resistance / level Sets/sec Reps Notes   Nustep       General LE strengthening       Endurance training                                                  Therapeutic Activities (22682)       Stair climbing activities                                   Neuromuscular Re-ed (46970)       Gait training including activities to increase step height and length                                           Manual Intervention ([phone])                                                     Pt. Education:  -patient educated on diagnosis, prognosis and expectations for rehab  -all patient questions were answered    Home Exercise Program:  3/12: none issued at evaluation    Therapeutic Exercise and NMR EXR  [x] (75447) Provided verbal/tactile cueing for activities related to strengthening, flexibility, endurance, ROM for improvements in  [x] LE / Lumbar: LE, proximal hip, and core control with self care, mobility, lifting, ambulation. [] UE / Cervical: cervical, postural, scapular, scapulothoracic and UE control with self care, reaching, carrying, lifting, house/yardwork, driving, computer work.  [] (84614) Provided verbal/tactile cueing for activities related to improving balance, coordination, kinesthetic sense, posture, motor skill, proprioception to assist with   [] LE / lumbar: LE, proximal hip, and core control in self care, mobility, lifting, ambulation and eccentric single leg control. [] UE / cervical: cervical, scapular, scapulothoracic and UE control with self care, reaching, carrying, lifting, house/yardwork, driving, computer work.   [] (42884) Therapist is in constant attendance of 2 or more patients providing skilled therapy interventions, but not providing any significant amount of measurable one-on-one time to either patient, for improvements in  [] LE / lumbar: LE, proximal hip, and core control in self care, mobility, lifting, ambulation and eccentric single leg control. [] UE / cervical: cervical, scapular, scapulothoracic and UE control with self care, reaching, carrying, lifting, house/yardwork, driving, computer work.      NMR and Therapeutic Activities:    [x] (65223 or 64470) Provided verbal/tactile cueing for activities related to improving balance, coordination, kinesthetic sense, posture, motor skill, proprioception and motor activation to allow for proper function of   [x] LE: / Lumbar core, proximal hip and LE with self care and ADLs  [] UE / Cervical: cervical, postural, scapular, scapulothoracic and UE control with self care, carrying, lifting, driving, computer work.   [] (18529) Gait Re-education- Provided training and instruction to the patient for proper LE, core and proximal hip recruitment and positioning and eccentric body weight control with ambulation re-education including up and down stairs     Home Management Training / Self Care:  [] (54519) Provided self-care/home management training related to activities of daily living and compensatory training, and/or use of adaptive equipment for improvement with: ADLs and compensatory training, meal preparation, safety procedures and instruction in use of adaptive equipment, including bathing, grooming, dressing, personal hygiene, basic household cleaning and chores.      Home Exercise Program:    [x] (83821) Reviewed/Progressed HEP activities related to strengthening, flexibility, endurance, ROM of   [] LE / Lumbar: core, proximal hip and LE for functional self-care, mobility, lifting and ambulation/stair navigation   [] UE / Cervical: cervical, postural, scapular, scapulothoracic and UE control with self care, reaching, carrying, lifting, house/yardwork, driving, computer work  [] (93376)Reviewed/Progressed HEP activities related to improving balance, coordination, kinesthetic sense, posture, motor skill, proprioception of   [] LE: core, proximal hip and LE for self care, mobility, lifting, and ambulation/stair navigation    [] UE / Cervical: cervical, postural,  scapular, scapulothoracic and UE control with self care, reaching, carrying, lifting, house/yardwork, driving, computer work    Manual Treatments:  PROM / STM / Oscillations-Mobs:  G-I, II, III, IV (PA's, Inf., Post.)  [] (77932) Provided manual therapy to mobilize LE, proximal hip and/or LS spine soft tissue/joints for

## 2020-03-11 NOTE — FLOWSHEET NOTE
activities related to strengthening, flexibility, endurance, ROM of   [] LE / Lumbar: core, proximal hip and LE for functional self-care, mobility, lifting and ambulation/stair navigation   [] UE / Cervical: cervical, postural, scapular, scapulothoracic and UE control with self care, reaching, carrying, lifting, house/yardwork, driving, computer work  [] (21702)Reviewed/Progressed HEP activities related to improving balance, coordination, kinesthetic sense, posture, motor skill, proprioception of   [] LE: core, proximal hip and LE for self care, mobility, lifting, and ambulation/stair navigation    [] UE / Cervical: cervical, postural,  scapular, scapulothoracic and UE control with self care, reaching, carrying, lifting, house/yardwork, driving, computer work    Manual Treatments:  PROM / STM / Oscillations-Mobs:  G-I, II, III, IV (PA's, Inf., Post.)  [] (39302) Provided manual therapy to mobilize LE, proximal hip and/or LS spine soft tissue/joints for the purpose of modulating pain, promoting relaxation,  increasing ROM, reducing/eliminating soft tissue swelling/inflammation/restriction, improving soft tissue extensibility and allowing for proper ROM for normal function with   [] LE / lumbar: self care, mobility, lifting and ambulation. [] UE / Cervical: self care, reaching, carrying, lifting, house/yardwork, driving, computer work. Acquatic:   [] (34574) Pt performing prescribed therapeutic exercises,neuromuscular re-education that is peroformed in a water based environment. Involves a heated therapy pool to mitigate the effects of gravity and/or optimize patient ability by way of soft tissue benefits of immersion in warm water during therapeutic exercise. Cognitive Training:   [] (55876) Activity designed to address attention, problem solving, and memory with or without compensatory techniques.   Provided due to challenges with one or more of the following performance deficits: decreased working being supervised but does not require one on one care. This may be used for pain relief via TENS, to manage spasticity, decrease muscle atrophy/ promote innervation of denervated musculature, etc. during instances in which the pt does not need care or is not engaging in a functional activity in conjunction with the use of the electrical stimulation. Contrast Bath (22533): Immersion of peripheral extremities in cold and warm water in an alternating fashion. This is performed in order to reduce scar sensitivity and decrease swelling      Charges:  Timed Code Treatment Minutes: 15   Total Treatment Minutes: 30     Charges:                                                Quantity:  [x] EVAL (LOW) 62381                  I 1              [] EVAL (MOD) 12807   I     [] EVAL (HIGH) 85292  I  [] OT Re-eval (86683)  I   [x] Halle (04116)     I  1  [] RPMWW(24731)   I  [] NMR (30632)    I  [] Estim (attended) (93965)   I  [] Manual (93263)      I  [] OT (23382)    I  [] TA (90326)     I  [] Paraffin (66667)   I  [] ADL (38476)     I  [] Orthotic/Splint Management                  and Training code (48564)  I  [] Aquatic therapy (87949)   I  [] Estim (unattended) (58365)  I  [] Fluidotherapy (40863)  I  [] Other:    I        GOALS:   hort Term Goals: To be achieved in: 30 days  1. Pt will demonstrate improved  strength as indicated by increased  by 5lb   []? Progressing: []? Met: []? Not Met: []? Adjusted  2. Pt will demonstrate improved fine motor skills as indicated by decreased time on 9 hole peg test by 10 seconds on RUE  []? Progressing: []? Met: []? Not Met: []? Adjusted  3. Pt will demonstrate increased sensation in RUE needed for clothing management donning/doffing buttons with dressing   []? Progressing: []? Met: []? Not Met: []? Adjusted     Long Term Goals: To be achieved by dishcharkennedy  1.  Pt will will report a QuickDASH Symptom Severity Scale score of 40% or less indicating increased safety and functional independence in desired occupational pursuits by discharge. []? Progressing: []? Met: []? Not Met: []? Adjusted    Overall Progression Towards Functional goals/ Treatment Progress Update:  [] Patient is progressing as expected towards functional goals listed. [] Progression is slowed due to complexities/Impairments listed. [] Progression has been slowed due to co-morbidities. [x] Plan just implemented, too soon to assess goals progression <30days   [] Goals require adjustment due to lack of progress  [] Patient is not progressing as expected and requires additional follow up with physician  [] Other    Persisting Functional Limitations/Impairments:  [x]Sleeping []Sitting       [x]Standing []Transfers        [x]Walking []Kneeling        [x]Stairs [x]Squatting / bending   [x]ADLs [x]Reaching  [x]Lifting  [x]IADLs  []Driving []Job related tasks  []Sports/Recreation []Other:        ASSESSMENT:  See eval  Treatment/Activity Tolerance:  [x] Patient able to complete tx  [] Patient limited by fatigue  [] Patient limited by pain  [] Patient limited by other medical complications  [] Other:     Prognosis: [] Good [x] Fair  [] Poor    Patient Requires Follow-up: [x] Yes  [] No    Plan for next treatment session:    PLAN: See eval. OT 2x / week for 6 weeks. [] Continue per plan of care [] Alter current plan (see comments)  [x] Plan of care initiated [] Hold pending MD visit [] Discharge        Electronically signed by:  Gladis Harding, OT          [address], S/OT      Occupational Therapist was present, directed the patient's care, made skilled judgement, and was responsible for assessment and treatment of the patient. Note: If patient does not return for scheduled/ recommended follow up visits, his note will serve as a discharge from care along with most recent update on progress.

## 2020-03-11 NOTE — PLAN OF CARE
[address] [place] [address] Physical Therapy  [address] [place] [address] Drive  Phone: [phone]   Fax:     [phone]                                                       Physical Therapy Certification    Dear Referring Practitioner: Katerin Contreras MD,    We had the pleasure of evaluating the following patient for physical therapy services at [address] [place] [address]. A summary of our findings can be found in the initial assessment below. This includes our plan of care. If you have any questions or concerns regarding these findings, please do not hesitate to contact me at the office phone number checked above. Thank you for the referral.       Physician Signature:_______________________________Date:__________________  By signing above (or electronic signature), therapists plan is approved by physician      Patient: Reina Chen   : 1950   MRN: [id number]  Referring Physician: Referring Practitioner: Katerin Contreras MD      Evaluation Date: 3/11/2020      Medical Diagnosis Information:  Diagnosis: Multiple Sclerosis Z86.69   Treatment Diagnosis: Right LE weakness, decreased hip and knee flexion and ankle DF during gait, difficulty with stairs, impaired static balance, reduced endurance, increased fall risk due to increased TUG time and impaired gait                                          Insurance information: PT Insurance Information: [employer] H[employer] - auth required, $40 copay    Precautions/ Contra-indications: HTN, MS, lumbar fusion, L knee surgery   Latex Allergy:  [x]NO      []YES  Preferred Language for Healthcare:   [x]English       []other:    SUBJECTIVE: Patient stated complaint: Pt complaining of R sided lower and upper extremity weakness. Her R side is weaker than her L. She does have constant back pain from a lumbar surgery in . Pt's  states that she fatigues very quickly.  Pt reports that she struggles with walking long distances and holding objects. Relevant Medical History: HTN, MS  Functional Outcome: TU.06 sec    Pain Scale: 7/10, low back   Easing factors: NA  Provocative factors:     Type: [x]Constant   []Intermittent  []Radiating []Localized []other:     Numbness/Tingling: denies N/T in her legs     Occupation/School: retired,       Living Status/Prior Level of Function: Prior to this injury / incident, pt was independent with ADLs and IADLs. Lives in 2 story home with spouse, 13 steps to get to bedroom/bathroom. Uses cane and walker as needed. [place]. Pt L hand dominant. Pt's  states they have all three types of walkers (std, RW, and 4WW) as well as a quad canes. OBJECTIVE:     Functional Mobility/Transfers: requires use of UEs for all transfers    Gait: decreased R hip and R knee flexion; decreased R ankle DF, pt fatigues quickly        AROM    L R   Decreased AROM with R hip flexion and R knee flexion in sitting               Strength (0-5) Left Right   All measured in seated      Hip flexion 4+ 2+   Hip abduction 4 2+   Hip adduction 4 2+   Knee extension 4+ 3+   Knee flexion 4+ 2+   Ankle dorsiflexion 4+ 3+                          Balance and Endurance Testing  Time Distance  Comments    TUG no AD 25.06 sec            6 min walk test no AD 4 min 22 sec 420 ft  Unable to complete entire test         R tandem  19 sec     L tandem 8 sec     Alternating block tap  Orange cone   20 taps completed in greater than 20 sec, required B HR use          Stair negotiation: 6 in steps, B HR use, nonreciprocal pattern, leading with L LE for ascent and descent                              [x] Patient history, allergies, meds reviewed. Medical chart reviewed. See intake form. Review Of Systems (ROS):  [x]Performed Review of systems (Integumentary, CardioPulmonary, Neurological) by intake and observation. Intake form has been scanned into medical record. Patient has been instructed to contact their primary care physician regarding ROS issues if not already being addressed at this time. Co-morbidities/Complexities (which will affect course of rehabilitation):   []None           Arthritic conditions   []Rheumatoid arthritis (M05.9)  []Osteoarthritis (M19.91)   Cardiovascular conditions   [x]Hypertension (I10)  []Hyperlipidemia (E78.5)  []Angina pectoris (I20)  []Atherosclerosis (I70)   Musculoskeletal conditions   []Disc pathology   []Congenital spine pathologies   [x]Prior surgical intervention  []Osteoporosis (M81.8)  []Osteopenia (M85.8)   Endocrine conditions   []Hypothyroid (E03.9)  []Hyperthyroid Gastrointestinal conditions   []Constipation (S58.13)   Metabolic conditions   []Morbid obesity (E66.01)  []Diabetes type 1(E10.65) or 2 (E11.65)   []Neuropathy (G60.9)     Pulmonary conditions   []Asthma (J45)  []Coughing   []COPD (J44.9)   Psychological Disorders  []Anxiety (F41.9)  []Depression (F32.9)   []Other:   [x]Other:    Multiple Sclerosis       Barriers to/and or personal factors that will affect rehab potential:              []Age  []Sex   [x]Smoker              []Motivation/Lack of Motivation                        []Co-Morbidities              []Cognitive Function, education/learning barriers              []Environmental, home barriers              []profession/work barriers  []past PT/medical experience  []other:  Justification: Pt is a smoker     Falls Risk Assessment (30 days):   [] Falls Risk assessed and no intervention required. [x] Falls Risk assessed and Patient requires intervention due to being higher risk   TUG score (>12s at risk):     [x] Falls education provided, including use of AD        ASSESSMENT: Pt is a 77year old female referred to physical therapy for LE weakness due to MS. Pt is presenting with R LE weakness resulting in difficulty climbing stairs and ambulating long distances.  The patient is also presenting with static balance deficits and in increased TUG time placing her at a higher risk for falls. This patient would benefit from skilled physical therapy to address these deficits to improve pt's quality of life. Functional Impairments:     []Noted  joint hypomobility   []Noted  joint hypermobility   []Decreased functional ROM   []Abnormal reflexes/sensation/myotomal/dermatomal deficits   [x]Decreased strength and neuromuscular control    [x]Decreased functional strength   []other:      Functional Activity Limitations (from functional questionnaire and intake)   [x]Reduced ability to tolerate prolonged functional positions   []Reduced ability or difficulty with changes of positions or transfers between positions   []Reduced ability to maintain good posture and demonstrate good body mechanics with sitting, bending, and lifting   [] Reduced ability or tolerance with driving and/or computer work   [x]Reduced ability to perform lifting, reaching, carrying tasks   []Reduced ability to concentrate   []Reduced ability to sleep    []Reduced ability to tolerate any impact    [x]Reduced ability to ambulate prolonged functional periods/distances   [x]other: Reduced ability to complete stairs     Participation Restrictions   []Reduced participation in self care activities   []Reduced participation in home management activities   []Reduced participation in work activities   [x]Reduced participation in social activities. []Reduced participation in sport/recreational activities.     Classification/Subgrouping:   [x]signs/symptoms consistent with lower extremity weakness and reduced activity tolerance as a result of multiple sclerosis     Prognosis/Rehab Potential:      []Excellent   [x]Good    [x]Fair   []Poor    Tolerance of evaluation/treatment:    []Excellent   [x]Good    []Fair   []Poor    Physical Therapy Evaluation Complexity Justification  [x] A history of present problem with:  [] no personal factors and/or comorbidities that impact the plan of care;  [x]1-2 personal factors and/or comorbidities that impact the plan of care  []3 personal factors and/or comorbidities that impact the plan of care  [x] An examination of body systems using standardized tests and measures addressing any of the following: body structures and functions (impairments), activity limitations, and/or participation restrictions;:  [x] a total of 1-2 or more elements   [] a total of 3 or more elements   [] a total of 4 or more elements   [x] A clinical presentation with:  [x] stable and/or uncomplicated characteristics   [] evolving clinical presentation with changing characteristics  [] unstable and unpredictable characteristics;   [x] Clinical decision making of [x] low, [] moderate, [] high complexity using standardized patient assessment instrument and/or measurable assessment of functional outcome. [x] EVAL (LOW) 08337 (typically 20 minutes face-to-face)  [] EVAL (MOD) 59165 (typically 30 minutes face-to-face)  [] EVAL (HIGH) 19237 (typically 45 minutes face-to-face)  [] RE-EVAL     PLAN:   Frequency/Duration:  2 days per week for 6 Weeks:  Interventions:  [x]  Therapeutic exercise including: strength training, ROM, including postural re-education. [x]  NMR activation and proprioception, including postural re-education. [x]  Manual therapy as indicated to include: Dry Needling/IASTM, STM, PROM, Gr I-IV mobilizations, manipulation. [x] Modalities as needed that may include: thermal agents, E-stim, Biofeedback, US, iontophoresis as indicated  [x] Patient education on joint protection, postural re-education, activity modification, progression of HEP. HEP instruction: Written HEP instructions provided and reviewed    GOALS:  Patient stated goal: pt would like [place] difficulty     Therapist goals for Patient:   Short Term Goals: To be achieved in: 2 weeks  1.  Independent in HEP and progression per patient tolerance, in order

## 2020-03-17 ENCOUNTER — HOSPITAL ENCOUNTER (OUTPATIENT)
Dept: OCCUPATIONAL THERAPY | Age: 70
Setting detail: THERAPIES SERIES
Discharge: HOME | End: 2020-03-17
Payer: MEDICARE

## 2020-03-17 ENCOUNTER — HOSPITAL ENCOUNTER (OUTPATIENT)
Dept: PHYSICAL THERAPY | Age: 70
Setting detail: THERAPIES SERIES
Discharge: HOME | End: 2020-03-17
Payer: MEDICARE

## 2020-03-17 PROCEDURE — 97022 WHIRLPOOL THERAPY: CPT

## 2020-03-17 PROCEDURE — 97110 THERAPEUTIC EXERCISES: CPT

## 2020-03-17 PROCEDURE — 97535 SELF CARE MNGMENT TRAINING: CPT

## 2020-03-17 NOTE — FLOWSHEET NOTE
following exercises were performed and added to the pt's home program (educated on appropriate frequency, intensity and duration etc.):   -3/17/20    Exercises   Finger Lumbricals with Putty - 10 reps - 1 sets - 1x daily - 7x weekly   Putty Squeezes - 10 reps - 1 sets - 1x daily - 7x weekly   Finger Exension with Putty - 10 reps - 1 sets - 1x daily - 7x weekly   Thumb Opposition with Putty - 5 reps - 1 sets - 1x daily - 7x weekly   Key Pinch with Putty - 10 reps - 1 sets - 1x daily - 7x weekly   3-Point Pinch with Putty - 10 reps - 1 sets - 1x daily - 7x weekly     Therapeutic Exercise and NMR EXR  [x] (10763) Provided verbal/tactile cueing for activities related to strengthening, flexibility, endurance, ROM for improvements in  [] LE / Lumbar: LE, proximal hip, and core control with self care, mobility, lifting, ambulation. [x] UE / Cervical: cervical, postural, scapular, scapulothoracic and UE control with self care, reaching, carrying, lifting, house/yardwork, driving, computer work.  [] (33411) Provided verbal/tactile cueing for activities related to improving balance, coordination, kinesthetic sense, posture, motor skill, proprioception to assist with   [] LE / lumbar: LE, proximal hip, and core control in self care, mobility, lifting, ambulation and eccentric single leg control. [] UE / cervical: cervical, scapular, scapulothoracic and UE control with self care, reaching, carrying, lifting, house/yardwork, driving, computer work.   [] (30315) Therapist is in constant attendance of 2 or more patients providing skilled therapy interventions, but not providing any significant amount of measurable one-on-one time to either patient, for improvements in  [] LE / lumbar: LE, proximal hip, and core control in self care, mobility, lifting, ambulation and eccentric single leg control.    [] UE / cervical: cervical, scapular, scapulothoracic and UE control with self care, reaching, carrying, lifting, house/yardwork, soft tissue extensibility. Paraffin:  [] (34500) Paraffin baths are precise instruments designed to safely facilitate hot paraffin treatments of the body extremities so as to relieve pain and discomfort associated with joint disease and/or injuries. This modality is used as a preparatory method prior to skilled occupational therapy services. Fluidotherapy:  [x] (48164) Is a dry heat which is comprised of a whirlpool of solid particles in a heated air stream, thus, having similar properties to a liquid. This is indicated to increase soft tissue distensibility, desensitize, or increase sensitization. Electrical Stimulation Attended (01200): One on one and constant attendance and direct manual patient contact. This can be chosen when e-stim is applied in addition to a therapeutic exercise of functional activity    Electrical Stimulation Unattended (22096/): Is billed when pt is being supervised but does not require one on one care. This may be used for pain relief via TENS, to manage spasticity, decrease muscle atrophy/ promote innervation of denervated musculature, etc. during instances in which the pt does not need care or is not engaging in a functional activity in conjunction with the use of the electrical stimulation. Contrast Bath (13193): Immersion of peripheral extremities in cold and warm water in an alternating fashion.   This is performed in order to reduce scar sensitivity and decrease swelling      Charges:  Timed Code Treatment Minutes: 48   Total Treatment Minutes: 63     Charges:                                                Quantity:  [] EVAL (LOW) 51007                  I            [] EVAL (MOD) 02461   I     [] EVAL (HIGH) 78200  I  [] OT Re-eval (91358)  I   [x] Halle (91909)     I  1  [] XCYUS(13831)   I  [] NMR (03830)    I  [] Estim (attended) (25098)   I  [] Manual (57240)      I  [] JI (88937)    I  [] TA (69388)     I  [] Paraffin (10927)   I  [x] ADL (09492)     I  2  [] Orthotic/Splint Management                  and Training code (47265)  I  [] Aquatic therapy (82334)   I  [] Estim (unattended) 33 93 31)  I  [x] Fluidotherapy (82619)  I  1  [] Other:    I        GOALS:   hort Term Goals: To be achieved in: 30 days  1. Pt will demonstrate improved  strength as indicated by increased  by 5lb   [x]? Progressing: []? Met: []? Not Met: []? Adjusted  2. Pt will demonstrate improved fine motor skills as indicated by decreased time on 9 hole peg test by 10 seconds on RUE  [x]? Progressing: []? Met: []? Not Met: []? Adjusted  3. Pt will demonstrate increased sensation in RUE needed for clothing management donning/doffing buttons with dressing   [x]? Progressing: []? Met: []? Not Met: []? Adjusted     Long Term Goals: To be achieved by jamarkennedy  1. Pt will will report a QuickDASH Symptom Severity Scale score of 40% or less indicating increased safety and functional independence in desired occupational pursuits by discharge. [x]? Progressing: []? Met: []? Not Met: []? Adjusted    Overall Progression Towards Functional goals/ Treatment Progress Update:  [] Patient is progressing as expected towards functional goals listed. [] Progression is slowed due to complexities/Impairments listed. [] Progression has been slowed due to co-morbidities.   [x] Plan just implemented, too soon to assess goals progression <30days   [] Goals require adjustment due to lack of progress  [] Patient is not progressing as expected and requires additional follow up with physician  [] Other    Persisting Functional Limitations/Impairments:  [x]Sleeping []Sitting       [x]Standing []Transfers        [x]Walking []Kneeling        [x]Stairs [x]Squatting / bending   [x]ADLs [x]Reaching  [x]Lifting  [x]IADLs  []Driving []Job related tasks  []Sports/Recreation []Other:        ASSESSMENT:  See eval  Treatment/Activity Tolerance:  [x] Patient able to complete tx  [] Patient limited by fatigue  [] Patient limited by

## 2020-03-17 NOTE — FLOWSHEET NOTE
Other:     GOALS:   Patient stated goal: pt would [place]      Therapist goals for Patient:   Short Term Goals: To be achieved in: 2 weeks  1. Independent in HEP and progression per patient tolerance, in order to prevent re-injury. 2. Patient will have a decrease in pain to facilitate improvement in movement, function, and ADLs as indicated by Functional Deficits.     Long Term Goals: To be achieved in: 6 weeks  1. Pt to improve TUG score to at least 18 sec to decrease risk for falls. 2. Patient will demonstrate increased distance with the 6 minute walk test to at least 800 feet to demo improved endurance. 3. Patient will demonstrate an increase in Strength to at least 3+/4- throughout R LE to allow for proper functional mobility as indicated by patients Functional Deficits. 4. Patient will return to functional activities including climbing 1 flight of stairs with reciprocal pattern and 1 HR. Overall Progression Towards Functional goals/ Treatment Progress Update:  [] Patient is progressing as expected towards functional goals listed. [] Progression is slowed due to complexities/Impairments listed. [] Progression has been slowed due to co-morbidities. [x] Plan just implemented, too soon to assess goals progression <30days   [] Goals require adjustment due to lack of progress  [] Patient is not progressing as expected and requires additional follow up with physician  [] Other    Persisting Functional Limitations/Impairments:  []Sleeping []Sitting               [x]Standing []Transfers        [x]Walking []Kneeling               [x]Stairs []Squatting / bending   []ADLs []Reaching  []Lifting  [x]Housework  []Driving []Job related tasks  []Sports/Recreation []Other:        ASSESSMENT:  PT treatment focus on  HEP this date due to PT department \"closing\" due to COVID-19. Handout given to patient.       Treatment/Activity Tolerance:  [] Patient able to complete tx [x] Patient limited by fatigue  [] Patient limited by pain  [] Patient limited by other medical complications  [] Other:     Prognosis: [] Good [x] Fair  [] Poor    Patient Requires Follow-up: [x] Yes  [] No    Plan for next treatment session: patient will be doing HEP until op PT dept opens back up. PLAN: See eval. PT 2x / week for 6 weeks requested, but waiting on insurance auth. [x] Continue per plan of care [] Alter current plan (see comments)  [] Plan of care initiated [] Hold pending MD visit [] Discharge    Electronically signed by: Bladimir Isabel PT, DPT    Note: If patient does not return for scheduled/ recommended follow up visits, this note will serve as a discharge from care along with most recent update on progress.

## 2020-03-20 ENCOUNTER — APPOINTMENT (OUTPATIENT)
Dept: OCCUPATIONAL THERAPY | Age: 70
End: 2020-03-20
Payer: MEDICARE

## 2020-03-20 ENCOUNTER — APPOINTMENT (OUTPATIENT)
Dept: PHYSICAL THERAPY | Age: 70
End: 2020-03-20
Payer: MEDICARE

## 2020-03-24 ENCOUNTER — APPOINTMENT (OUTPATIENT)
Dept: PHYSICAL THERAPY | Age: 70
End: 2020-03-24
Payer: MEDICARE

## 2020-03-24 ENCOUNTER — APPOINTMENT (OUTPATIENT)
Dept: OCCUPATIONAL THERAPY | Age: 70
End: 2020-03-24
Payer: MEDICARE

## 2020-03-27 ENCOUNTER — APPOINTMENT (OUTPATIENT)
Dept: PHYSICAL THERAPY | Age: 70
End: 2020-03-27
Payer: MEDICARE

## 2020-03-27 ENCOUNTER — APPOINTMENT (OUTPATIENT)
Dept: OCCUPATIONAL THERAPY | Age: 70
End: 2020-03-27
Payer: MEDICARE

## 2020-03-31 ENCOUNTER — APPOINTMENT (OUTPATIENT)
Dept: OCCUPATIONAL THERAPY | Age: 70
End: 2020-03-31
Payer: MEDICARE

## 2020-03-31 ENCOUNTER — APPOINTMENT (OUTPATIENT)
Dept: PHYSICAL THERAPY | Age: 70
End: 2020-03-31
Payer: MEDICARE

## 2020-07-07 ENCOUNTER — OFFICE VISIT (OUTPATIENT)
Dept: NEUROLOGY | Age: 70
End: 2020-07-07
Payer: MEDICARE

## 2020-07-07 VITALS
WEIGHT: 230 LBS | HEIGHT: 65 IN | HEART RATE: 62 BPM | DIASTOLIC BLOOD PRESSURE: 70 MMHG | BODY MASS INDEX: 38.32 KG/M2 | SYSTOLIC BLOOD PRESSURE: 113 MMHG

## 2020-07-07 PROCEDURE — 99205 OFFICE O/P NEW HI 60 MIN: CPT | Performed by: PSYCHIATRY & NEUROLOGY

## 2020-07-07 RX ORDER — FUROSEMIDE 20 MG/1
20 TABLET ORAL DAILY
COMMUNITY
Start: 2020-07-01

## 2020-07-07 NOTE — PATIENT INSTRUCTIONS
Please call with any questions or concerns:   [facility] Neurology  @ [phone]. LAB RESULTS:  Please obtain any labs or diagnostic tests as discussed today. You should call the office to check the results. Please allow  3 to 7 days for us to get these results. MEDICATION LIST:  Please bring an accurate list of your medications to every visit. APPOINTMENT CONFIRMATION:  We will call you the day before your scheduled appointment to confirm. If we are unable to reach you, you MUST call back by the end of the day to confirm the appointment or we may be forced to cancel.

## 2020-07-07 NOTE — PROGRESS NOTES
NEUROLOGY CONSULTATION     Chief Complaint   Patient presents with    New Patient     dr gracia for MS, since 2002       HISTORY OF PRESENT ILLNESS :    Marcelo Lisa is a 77 y.o. female who is referred by Dr. Norma Zhong   History was obtained from patient  Additional history was obtained from her family. Patient was referred for evaluation of multiple sclerosis. Patient was diagnosed in early 2000's with multiple sclerosis after she had back surgery. This was confirmed by demyelinating plaques in the MRI brain and cervical spine. Patient and her family tell me that she had a lumbar puncture and the spinal fluid was also positive for MS. Patient has not seen a doctor in several years. She used to be followed at [facility] several years ago. Patient is not on any disease modifying therapy. They had talked to her about Aubagio or Gilenya but patient never went back and she was not started on medication. Currently patient is on baclofen for muscle spasms. Patient symptoms apparently have been worse since December 2019. She has increased difficulty walking. She also has some leg swelling. She has incontinence of urine. Symptom onset was gradual.  Symptoms are persistent. No clear aggravating or relieving factors to symptoms. Comorbidities include hypertension as well as hyperlipidemia.     REVIEW OF SYSTEMS    Constitutional:  []   Chills   []  Fatigue   []  Fevers   []  Malaise   []  Weight loss     [x] Denies all of the above    Eyes:  []  Double vision   []  Blurry vision     [x] Denies all of the above    Ears, nose, mouth, throat, and face:   [] Hearing loss    []   Hoarseness      []  Snoring    []  Tinnitus       [x] Denies all of the above     Respiratory:   []  Cough    []  Shortness of breath         [x] Denies all of the above     Cardiovascular:   []  Chest pain    []  Exertional chest pressure/discomfort [] Palpitations    []  Syncope     [x] Denies all of the above    Gastrointestinal:   []  Abdominal pain   []  Constipation    []  Diarrhea    []   Dysphagia                      [x] Denies all of the above    Genitourinary:      [x]  Frequency   []  Hematuria     [x]  Urinary incontinence           [] Denies all of the above     Hematologic/lymphatic:  []  Bleeding    [x]  Easy bruising   []  Anemia  [] Denies all of the above     Musculoskeletal:   [] Back pain       []  Myalgias    []  Neck pain           [x] Denies all of the above    Neurological: As noted in HPI    Behavioral/Psych:   [x] Anxiety    [x]  Depression     [x]  Mood swings     [] Denies all of the above     Endocrine:   []  Temperature intolerance     [] Fatigue      [x] Denies all of the above     Allergic/Immunologic:   [] Hay fever    [x] Denies all of the above     Past Medical History:   Diagnosis Date    Hypertension     MS ([facility] sclerosis) ([facility]      No family history on file.   Social History     Socioeconomic History    Marital status:      Spouse name: Not on file    Number of children: Not on file    Years of education: Not on file    Highest education level: Not on file   Occupational History    Not on file   Social Needs    Financial resource strain: Not on file    Food insecurity     Worry: Not on file     Inability: Not on file    Transportation needs     Medical: Not on file     Non-medical: Not on file   Tobacco Use    Smoking status: Former Smoker     Packs/day: 0.14     Types: Cigarettes    Smokeless tobacco: Never Used   Substance and Sexual Activity    Alcohol use: Yes     Comment: occassionally    Drug use: No    Sexual activity: Not Currently     Partners: Male   Lifestyle    Physical activity     Days per week: Not on file     Minutes per session: Not on file    Stress: Not on file   Relationships    Social connections     Talks on phone: Not on file     Gets together: Not on file Attends Oriental orthodox service: Not on file     Active member of club or organization: Not on file     Attends meetings of clubs or organizations: Not on file     Relationship status: Not on file    Intimate partner violence     Fear of current or ex partner: Not on file     Emotionally abused: Not on file     Physically abused: Not on file     Forced sexual activity: Not on file   Other Topics Concern    Not on file   Social History Narrative    Not on file       PHYSICAL EXAMINATION:  /70   Pulse 62   Ht 5' 4.5\" (1.638 m)   Wt 230 lb (104.3 kg)   BMI 38.87 kg/m²   Appearance: Well appearing, well nourished and in no distress  Mental Status Exam: Patient is alert, oriented to person, place and time. Recent and remote memory is normal  Fund of Knowledge is normal  Attention/concentration is normal.   Speech : No dysarthria  Language : No aphasia  Funduscopic Exam: sharp disc margins  Cranial Nerves:   II: Visual fields:  Full to confrontation  III: Pupils:  equal, round, reactive to light  III,IV,VI: Extra Ocular Movements are intact. No nystagmus  V: Facial sensation is intact to pin prick and light touch  VII: Facial strength and movements: intact and symmetric smile,cheek puffing and eyebrow elevation  VIII: Hearing:  Intact to finger rub bilaterally  IX: Palate  elevation is symmetric  XI: Shoulder shrug is intact  XII: Tongue movements are normal  Motor:  Muscle tone is been increased in the arms and legs. Strength is 4/5 in the upper extremities and 3/5 in the lower extremities. Sensory: Decreased to light touch in a nondermatomal fashion. Coordination: Impaired finger to Nose and Heel to Shin bilaterally but more on the right compared to the left side  . Reflexes:  DTR +2 and symmetric bilaterally in the upper extremities and 3+ in the lower extremities  Plantar response: Extensor bilaterally  Gait: Gait is significantly impaired  Romberg: Could not be tested  Vascular: No carotid bruit bilaterally        DATA:  LABS:  General Labs:    CBC:   Lab Results   Component Value Date    WBC 5.4 12/23/2019    RBC 4.44 12/23/2019    HGB 12.8 12/23/2019    HCT 39.5 12/23/2019    MCV 88.9 12/23/2019    MCH 28.7 12/23/2019    MCHC 32.3 12/23/2019    RDW 14.6 12/23/2019     12/23/2019    MPV 7.8 12/23/2019     BMP:    Lab Results   Component Value Date     12/23/2019    K 3.7 12/23/2019     12/23/2019    CO2 20 12/23/2019    BUN 17 12/23/2019    LABALBU 4.2 12/23/2019    CREATININE 0.9 12/23/2019    CALCIUM 9.5 12/23/2019    GFRAA >60 12/23/2019    GFRAA >60 07/06/2011    LABGLOM >60 12/23/2019    GLUCOSE 135 12/23/2019     RADIOLOGY REVIEW:  I have reviewed radiology reports(s) of: MRI brain and cervical spine done at [facility] in 2013    IMPRESSION :  Multiple sclerosis with recent significant exacerbation  Paraparesis  Ataxia  Poor coordination more so on the right side than left side  Patient is not on any immunomodulating therapy at this time    RECOMMENDATIONS :  Discussed at length with patient and her family  I will get MRI brain as well as cervical spine with contrast to see if there are any active lesions  Depending on the results patient may need IV steroids  After the IV steroids we will consider disease modifying therapy probably Tecfidera. Patient tells me that she has been diagnosed with a blood clot in her legs and this getting hematology consultation in the next few days. I will see her back in a month for follow-up. Thank you for this consultation. Please note a portion of this chart was generated using dragon dictation software. Although every effort was made to ensure the accuracy of this automated transcription, some errors in transcription may have occurred.

## 2020-07-07 NOTE — LETTER
[facility]  [address] [place] [address]  Phone: [phone]  Fax: [phone]    Jasiel Booker MD        July 7, 2020       Patient: Pranay Dukes   MR Number: [id number]   YOB: 1950   Date of Visit: 7/7/2020       Dear Dr. Bettina Martinez:    Thank you for the request for consultation for Pranay Dukes to me for the evaluation of multiple sclerosis. Below are the relevant portions of my assessment and plan of care. NEUROLOGY CONSULTATION     Chief Complaint   Patient presents with    New Patient     dr gracia for MS, since 2002       HISTORY OF PRESENT ILLNESS :    Pranay Dukes is a 77 y.o. female who is referred by Dr. Bettina Martinez   History was obtained from patient  Additional history was obtained from her family. Patient was referred for evaluation of multiple sclerosis. Patient was diagnosed in early 2000's with multiple sclerosis after she had back surgery. This was confirmed by demyelinating plaques in the MRI brain and cervical spine. Patient and her family tell me that she had a lumbar puncture and the spinal fluid was also positive for MS. Patient has not seen a doctor in several years. She used to be followed at [facility] several years ago. Patient is not on any disease modifying therapy. They had talked to her about Aubagio or Gilenya but patient never went back and she was not started on medication. Currently patient is on baclofen for muscle spasms. Patient symptoms apparently have been worse since December 2019. She has increased difficulty walking. She also has some leg swelling. She has incontinence of urine. Symptom onset was gradual.  Symptoms are persistent. No clear aggravating or relieving factors to symptoms. Comorbidities include hypertension as well as hyperlipidemia.     REVIEW OF SYSTEMS    Constitutional: []   Chills   []  Fatigue   []  Fevers   []  Malaise   []  Weight loss     [x] Denies all of the above    Eyes:  []  Double vision   []  Blurry vision     [x] Denies all of the above    Ears, nose, mouth, throat, and face:   [] Hearing loss    []   Hoarseness      []  Snoring    []  Tinnitus       [x] Denies all of the above     Respiratory:   []  Cough    []  Shortness of breath         [x] Denies all of the above     Cardiovascular:   []  Chest pain    []  Exertional chest pressure/discomfort           [] Palpitations    []  Syncope     [x] Denies all of the above    Gastrointestinal:   []  Abdominal pain   []  Constipation    []  Diarrhea    []   Dysphagia                      [x] Denies all of the above    Genitourinary:      [x]  Frequency   []  Hematuria     [x]  Urinary incontinence           [] Denies all of the above     Hematologic/lymphatic:  []  Bleeding    [x]  Easy bruising   []  Anemia  [] Denies all of the above     Musculoskeletal:   [] Back pain       []  Myalgias    []  Neck pain           [x] Denies all of the above    Neurological: As noted in HPI    Behavioral/Psych:   [x] Anxiety    [x]  Depression     [x]  Mood swings     [] Denies all of the above     Endocrine:   []  Temperature intolerance     [] Fatigue      [x] Denies all of the above     Allergic/Immunologic:   [] Hay fever    [x] Denies all of the above     Past Medical History:   Diagnosis Date    Hypertension     [facility]      No family history on file.   Social History     Socioeconomic History    Marital status:      Spouse name: Not on file    Number of children: Not on file    Years of education: Not on file    Highest education level: Not on file   Occupational History    Not on file   Social Needs    Financial resource strain: Not on file    Food insecurity     Worry: Not on file     Inability: Not on file    Transportation needs     Medical: Not on file     Non-medical: Not on file Tobacco Use    Smoking status: Former Smoker     Packs/day: 0.14     Types: Cigarettes    Smokeless tobacco: Never Used   Substance and Sexual Activity    Alcohol use: Yes     Comment: occassionally    Drug use: No    Sexual activity: Not Currently     Partners: Male   Lifestyle    Physical activity     Days per week: Not on file     Minutes per session: Not on file    Stress: Not on file   Relationships    Social connections     Talks on phone: Not on file     Gets together: Not on file     Attends Denominational service: Not on file     Active member of club or organization: Not on file     Attends meetings of clubs or organizations: Not on file     Relationship status: Not on file    Intimate partner violence     Fear of current or ex partner: Not on file     Emotionally abused: Not on file     Physically abused: Not on file     Forced sexual activity: Not on file   Other Topics Concern    Not on file   Social History Narrative    Not on file       PHYSICAL EXAMINATION:  /70   Pulse 62   Ht 5' 4.5\" (1.638 m)   Wt 230 lb (104.3 kg)   BMI 38.87 kg/m²    Appearance: Well appearing, well nourished and in no distress  Mental Status Exam: Patient is alert, oriented to person, place and time. Recent and remote memory is normal  Fund of Knowledge is normal  Attention/concentration is normal.   Speech : No dysarthria  Language : No aphasia  Funduscopic Exam: sharp disc margins  Cranial Nerves:   II: Visual fields:  Full to confrontation  III: Pupils:  equal, round, reactive to light  III,IV,VI: Extra Ocular Movements are intact.  No nystagmus  V: Facial sensation is intact to pin prick and light touch  VII: Facial strength and movements: intact and symmetric smile,cheek puffing and eyebrow elevation  VIII: Hearing:  Intact to finger rub bilaterally  IX: Palate  elevation is symmetric  XI: Shoulder shrug is intact  XII: Tongue movements are normal Motor:  Muscle tone is been increased in the arms and legs. Strength is 4/5 in the upper extremities and 3/5 in the lower extremities. Sensory: Decreased to light touch in a nondermatomal fashion. Coordination: Impaired finger to Nose and Heel to Shin bilaterally but more on the right compared to the left side  . Reflexes:  DTR +2 and symmetric bilaterally in the upper extremities and 3+ in the lower extremities  Plantar response: Extensor bilaterally  Gait: Gait is significantly impaired  Romberg: Could not be tested  Vascular: No carotid bruit bilaterally        DATA:  LABS:  General Labs:    CBC:   Lab Results   Component Value Date    WBC 5.4 12/23/2019    RBC 4.44 12/23/2019    HGB 12.8 12/23/2019    HCT 39.5 12/23/2019    MCV 88.9 12/23/2019    MCH 28.7 12/23/2019    MCHC 32.3 12/23/2019    RDW 14.6 12/23/2019     12/23/2019    MPV 7.8 12/23/2019     BMP:    Lab Results   Component Value Date     12/23/2019    K 3.7 12/23/2019     12/23/2019    CO2 20 12/23/2019    BUN 17 12/23/2019    LABALBU 4.2 12/23/2019    CREATININE 0.9 12/23/2019    CALCIUM 9.5 12/23/2019    GFRAA >60 12/23/2019    GFRAA >60 07/06/2011    LABGLOM >60 12/23/2019    GLUCOSE 135 12/23/2019     RADIOLOGY REVIEW:  I have reviewed radiology reports(s) of: MRI brain and cervical spine done at [facility] in 2013    IMPRESSION :  Multiple sclerosis with recent significant exacerbation  Paraparesis  Ataxia  Poor coordination more so on the right side than left side  Patient is not on any immunomodulating therapy at this time    RECOMMENDATIONS :  Discussed at length with patient and her family  I will get MRI brain as well as cervical spine with contrast to see if there are any active lesions  Depending on the results patient may need IV steroids  After the IV steroids we will consider disease modifying therapy probably Tecfidera.   Patient tells me that she has been diagnosed with a blood clot in her legs

## 2020-07-16 ENCOUNTER — HOSPITAL ENCOUNTER (OUTPATIENT)
Dept: MRI IMAGING | Age: 70
Discharge: HOME | End: 2020-07-16
Payer: MEDICARE

## 2020-07-16 PROCEDURE — 72156 MRI NECK SPINE W/O & W/DYE: CPT

## 2020-07-16 PROCEDURE — A9579 GAD-BASE MR CONTRAST NOS,1ML: HCPCS | Performed by: PSYCHIATRY & NEUROLOGY

## 2020-07-16 PROCEDURE — 70553 MRI BRAIN STEM W/O & W/DYE: CPT

## 2020-07-16 PROCEDURE — 6360000004 HC RX CONTRAST MEDICATION: Performed by: PSYCHIATRY & NEUROLOGY

## 2020-07-16 RX ADMIN — GADOTERIDOL 20 ML: 279.3 INJECTION, SOLUTION INTRAVENOUS at 14:31

## 2020-07-22 ENCOUNTER — TELEPHONE (OUTPATIENT)
Dept: NEUROLOGY | Age: 70
End: 2020-07-22

## 2020-07-22 PROBLEM — G35 MULTIPLE SCLEROSIS (HCC): Status: ACTIVE | Noted: 2020-07-22

## 2020-07-22 RX ORDER — SODIUM CHLORIDE 9 MG/ML
INJECTION, SOLUTION INTRAVENOUS CONTINUOUS
Status: CANCELLED
Start: 2020-07-22

## 2020-07-22 RX ORDER — SODIUM CHLORIDE 0.9 % (FLUSH) 0.9 %
10 SYRINGE (ML) INJECTION PRN
Status: CANCELLED | OUTPATIENT
Start: 2020-07-22

## 2020-07-22 RX ORDER — METHYLPREDNISOLONE SODIUM SUCCINATE 500 MG/8ML
500 INJECTION INTRAMUSCULAR; INTRAVENOUS DAILY
Qty: 2000 MG | Refills: 0 | Status: SHIPPED | OUTPATIENT
Start: 2020-07-22 | End: 2020-07-26

## 2020-07-22 NOTE — TELEPHONE ENCOUNTER
Relayed MD message to Maria Del Carmen Pina she gave verbal understanding. Faxed order to [facility] infusion center. They will call her to set this up.

## 2020-07-22 NOTE — TELEPHONE ENCOUNTER
----- Message from Ariel Dozier MD sent at 7/22/2020  8:58 AM EDT -----  Patient has demyelinating plaque in the cervical spinal cord indicating exacerbation of multiple sclerosis. Needs IV Solu-Medrol 500 mg daily for 4 days. I need to see her back in the office in about 4 to 6 weeks after Solu-Medrol treatment.

## 2020-07-27 ENCOUNTER — HOSPITAL ENCOUNTER (OUTPATIENT)
Dept: ONCOLOGY | Age: 70
Setting detail: INFUSION SERIES
Discharge: HOME | End: 2020-07-27
Payer: MEDICARE

## 2020-07-27 VITALS
RESPIRATION RATE: 16 BRPM | SYSTOLIC BLOOD PRESSURE: 140 MMHG | TEMPERATURE: 98.6 F | HEART RATE: 70 BPM | DIASTOLIC BLOOD PRESSURE: 92 MMHG

## 2020-07-27 DIAGNOSIS — G35 MULTIPLE SCLEROSIS (HCC): Primary | ICD-10-CM

## 2020-07-27 PROCEDURE — 2580000003 HC RX 258: Performed by: PSYCHIATRY & NEUROLOGY

## 2020-07-27 PROCEDURE — 96365 THER/PROPH/DIAG IV INF INIT: CPT

## 2020-07-27 PROCEDURE — 6360000002 HC RX W HCPCS: Performed by: PSYCHIATRY & NEUROLOGY

## 2020-07-27 PROCEDURE — 96366 THER/PROPH/DIAG IV INF ADDON: CPT

## 2020-07-27 RX ORDER — SODIUM CHLORIDE 0.9 % (FLUSH) 0.9 %
10 SYRINGE (ML) INJECTION PRN
Status: DISCONTINUED | OUTPATIENT
Start: 2020-07-27 | End: 2020-07-28 | Stop reason: HOSPADM

## 2020-07-27 RX ORDER — SODIUM CHLORIDE 0.9 % (FLUSH) 0.9 %
10 SYRINGE (ML) INJECTION PRN
Status: CANCELLED | OUTPATIENT
Start: 2020-07-28

## 2020-07-27 RX ORDER — SODIUM CHLORIDE 9 MG/ML
INJECTION, SOLUTION INTRAVENOUS CONTINUOUS
Status: DISCONTINUED | OUTPATIENT
Start: 2020-07-27 | End: 2020-07-28 | Stop reason: HOSPADM

## 2020-07-27 RX ORDER — SODIUM CHLORIDE 9 MG/ML
INJECTION, SOLUTION INTRAVENOUS CONTINUOUS
Status: CANCELLED
Start: 2020-07-28

## 2020-07-27 RX ADMIN — SODIUM CHLORIDE: 9 INJECTION, SOLUTION INTRAVENOUS at 11:04

## 2020-07-27 RX ADMIN — Medication 10 ML: at 11:50

## 2020-07-27 RX ADMIN — SODIUM CHLORIDE 500 MG: 9 INJECTION, SOLUTION INTRAVENOUS at 11:04

## 2020-07-27 NOTE — PROGRESS NOTES
Patient ambulatory to department for Solumedrol infusion. . Aware of possible side effects. Given 500 mg iv over 3 hours as per order by Dr Bill Taylor. Tolerated infusion well. To return tomorrow for next infusion. Denies need for any additional educational materials.

## 2020-07-28 ENCOUNTER — HOSPITAL ENCOUNTER (OUTPATIENT)
Dept: ONCOLOGY | Age: 70
Setting detail: INFUSION SERIES
Discharge: HOME | End: 2020-07-28
Payer: MEDICARE

## 2020-07-28 VITALS
SYSTOLIC BLOOD PRESSURE: 142 MMHG | TEMPERATURE: 98.6 F | RESPIRATION RATE: 16 BRPM | HEART RATE: 64 BPM | DIASTOLIC BLOOD PRESSURE: 71 MMHG

## 2020-07-28 DIAGNOSIS — G35 MULTIPLE SCLEROSIS (HCC): Primary | ICD-10-CM

## 2020-07-28 PROCEDURE — 96365 THER/PROPH/DIAG IV INF INIT: CPT

## 2020-07-28 PROCEDURE — 2580000003 HC RX 258: Performed by: PSYCHIATRY & NEUROLOGY

## 2020-07-28 PROCEDURE — 6360000002 HC RX W HCPCS: Performed by: PSYCHIATRY & NEUROLOGY

## 2020-07-28 PROCEDURE — 96366 THER/PROPH/DIAG IV INF ADDON: CPT

## 2020-07-28 RX ORDER — SODIUM CHLORIDE 9 MG/ML
INJECTION, SOLUTION INTRAVENOUS CONTINUOUS
Status: CANCELLED
Start: 2020-07-29

## 2020-07-28 RX ORDER — SODIUM CHLORIDE 0.9 % (FLUSH) 0.9 %
10 SYRINGE (ML) INJECTION PRN
Status: CANCELLED | OUTPATIENT
Start: 2020-07-29

## 2020-07-28 RX ORDER — SODIUM CHLORIDE 9 MG/ML
INJECTION, SOLUTION INTRAVENOUS CONTINUOUS
Status: DISCONTINUED | OUTPATIENT
Start: 2020-07-28 | End: 2020-07-29 | Stop reason: HOSPADM

## 2020-07-28 RX ORDER — SODIUM CHLORIDE 0.9 % (FLUSH) 0.9 %
10 SYRINGE (ML) INJECTION PRN
Status: DISCONTINUED | OUTPATIENT
Start: 2020-07-28 | End: 2020-07-29 | Stop reason: HOSPADM

## 2020-07-28 RX ADMIN — SODIUM CHLORIDE 500 MG: 9 INJECTION, SOLUTION INTRAVENOUS at 11:06

## 2020-07-28 NOTE — PROGRESS NOTES
Patient ambulatory to department for Solumedrol infusion. . Aware of possible side effects. Given 500 mg iv over 3 hours as per order by Dr Carla Garcia. Tolerated infusion well. To return tomorrow for next infusion. Denies need for any additional educational materials.

## 2020-07-29 ENCOUNTER — HOSPITAL ENCOUNTER (OUTPATIENT)
Dept: ONCOLOGY | Age: 70
Setting detail: INFUSION SERIES
Discharge: HOME | End: 2020-07-29
Payer: MEDICARE

## 2020-07-29 DIAGNOSIS — G35 MULTIPLE SCLEROSIS (HCC): Primary | ICD-10-CM

## 2020-07-29 PROCEDURE — 2580000003 HC RX 258: Performed by: PSYCHIATRY & NEUROLOGY

## 2020-07-29 PROCEDURE — 96365 THER/PROPH/DIAG IV INF INIT: CPT

## 2020-07-29 PROCEDURE — 96366 THER/PROPH/DIAG IV INF ADDON: CPT

## 2020-07-29 PROCEDURE — 6360000002 HC RX W HCPCS: Performed by: PSYCHIATRY & NEUROLOGY

## 2020-07-29 RX ORDER — SODIUM CHLORIDE 9 MG/ML
INJECTION, SOLUTION INTRAVENOUS CONTINUOUS
Status: CANCELLED
Start: 2020-07-30

## 2020-07-29 RX ORDER — SODIUM CHLORIDE 9 MG/ML
INJECTION, SOLUTION INTRAVENOUS CONTINUOUS
Status: DISCONTINUED | OUTPATIENT
Start: 2020-07-29 | End: 2020-07-30 | Stop reason: HOSPADM

## 2020-07-29 RX ORDER — SODIUM CHLORIDE 0.9 % (FLUSH) 0.9 %
10 SYRINGE (ML) INJECTION PRN
Status: CANCELLED | OUTPATIENT
Start: 2020-07-30

## 2020-07-29 RX ORDER — SODIUM CHLORIDE 0.9 % (FLUSH) 0.9 %
10 SYRINGE (ML) INJECTION PRN
Status: DISCONTINUED | OUTPATIENT
Start: 2020-07-29 | End: 2020-07-30 | Stop reason: HOSPADM

## 2020-07-29 RX ADMIN — SODIUM CHLORIDE: 9 INJECTION, SOLUTION INTRAVENOUS at 12:26

## 2020-07-29 RX ADMIN — SODIUM CHLORIDE 500 MG: 9 INJECTION, SOLUTION INTRAVENOUS at 12:25

## 2020-07-29 RX ADMIN — Medication 10 ML: at 12:27

## 2020-07-29 NOTE — PROGRESS NOTES
Patient ambulatory to department for Solumedrol infusion. . Aware of possible side effects. Given 500 mg iv over 3 hours as per order by Dr Clary Medina. Tolerated infusion well. To return tomorrow for next infusion.  Denies need for any additional educational materials

## 2020-07-30 ENCOUNTER — HOSPITAL ENCOUNTER (OUTPATIENT)
Dept: ONCOLOGY | Age: 70
Setting detail: INFUSION SERIES
Discharge: HOME | End: 2020-07-30
Payer: MEDICARE

## 2020-07-30 VITALS
SYSTOLIC BLOOD PRESSURE: 138 MMHG | HEART RATE: 62 BPM | TEMPERATURE: 98.6 F | DIASTOLIC BLOOD PRESSURE: 70 MMHG | RESPIRATION RATE: 16 BRPM

## 2020-07-30 DIAGNOSIS — G35 MULTIPLE SCLEROSIS (HCC): Primary | ICD-10-CM

## 2020-07-30 LAB
ANION GAP SERPL CALCULATED.3IONS-SCNC: 14 MMOL/L (ref 3–16)
BUN BLDV-MCNC: 36 MG/DL (ref 7–20)
CALCIUM SERPL-MCNC: 9.1 MG/DL (ref 8.3–10.6)
CHLORIDE BLD-SCNC: 101 MMOL/L (ref 99–110)
CO2: 27 MMOL/L (ref 21–32)
CREAT SERPL-MCNC: 1.2 MG/DL (ref 0.6–1.2)
GFR AFRICAN AMERICAN: 54
GFR NON-AFRICAN AMERICAN: 45
GLUCOSE BLD-MCNC: 114 MG/DL (ref 70–99)
POTASSIUM SERPL-SCNC: 3 MMOL/L (ref 3.5–5.1)
SODIUM BLD-SCNC: 142 MMOL/L (ref 136–145)

## 2020-07-30 PROCEDURE — 99211 OFF/OP EST MAY X REQ PHY/QHP: CPT

## 2020-07-30 PROCEDURE — 6360000002 HC RX W HCPCS: Performed by: PSYCHIATRY & NEUROLOGY

## 2020-07-30 PROCEDURE — 96366 THER/PROPH/DIAG IV INF ADDON: CPT

## 2020-07-30 PROCEDURE — 80048 BASIC METABOLIC PNL TOTAL CA: CPT

## 2020-07-30 PROCEDURE — 2580000003 HC RX 258: Performed by: PSYCHIATRY & NEUROLOGY

## 2020-07-30 PROCEDURE — 96365 THER/PROPH/DIAG IV INF INIT: CPT

## 2020-07-30 RX ORDER — SODIUM CHLORIDE 0.9 % (FLUSH) 0.9 %
10 SYRINGE (ML) INJECTION PRN
Status: CANCELLED | OUTPATIENT
Start: 2020-07-30

## 2020-07-30 RX ORDER — SODIUM CHLORIDE 9 MG/ML
INJECTION, SOLUTION INTRAVENOUS CONTINUOUS
Status: CANCELLED
Start: 2020-07-30

## 2020-07-30 RX ORDER — SODIUM CHLORIDE 9 MG/ML
INJECTION, SOLUTION INTRAVENOUS CONTINUOUS
Status: DISCONTINUED | OUTPATIENT
Start: 2020-07-30 | End: 2020-07-30

## 2020-07-30 RX ORDER — SODIUM CHLORIDE 0.9 % (FLUSH) 0.9 %
10 SYRINGE (ML) INJECTION PRN
Status: DISCONTINUED | OUTPATIENT
Start: 2020-07-30 | End: 2020-07-30

## 2020-07-30 RX ADMIN — Medication 10 ML: at 14:46

## 2020-07-30 RX ADMIN — SODIUM CHLORIDE: 9 INJECTION, SOLUTION INTRAVENOUS at 11:38

## 2020-07-30 RX ADMIN — SODIUM CHLORIDE 500 MG: 9 INJECTION, SOLUTION INTRAVENOUS at 11:38

## 2020-07-30 NOTE — PROGRESS NOTES
Patient ambulatory to department for Solumedrol infusion. . Aware of possible side effects. Given 500 mg iv over 3 hours as per order by Dr Jorge Stanley. Tolerated infusion well. To return tomorrow for next infusion. Denies need for any additional educational materials. Patient lab work drawn per orders, potassium 3.0. Encouraged patient to increase oral intake of potassium rich foods. Reviewed food options with patient. Encouraged patient to follow up with doctor regarding potassium. Explained to patient that she is on a diuretic, but no replacement potassium. Patient verbally understands.

## 2020-08-03 ENCOUNTER — TELEPHONE (OUTPATIENT)
Dept: NEUROLOGY | Age: 70
End: 2020-08-03

## 2020-08-03 NOTE — TELEPHONE ENCOUNTER
Per Harriett Jones her PCP is Dr. Diaz Noel at [facility] through [facility]. I will fax results to their office now.

## 2020-08-03 NOTE — TELEPHONE ENCOUNTER
----- Message from Luz Nina MD sent at 8/2/2020  3:59 PM EDT -----  Potassium level is a little low. Please forward results to patient's primary care physician.

## 2020-08-14 ENCOUNTER — OFFICE VISIT (OUTPATIENT)
Dept: NEUROLOGY | Age: 70
End: 2020-08-14
Payer: MEDICARE

## 2020-08-14 VITALS
WEIGHT: 230 LBS | HEART RATE: 61 BPM | SYSTOLIC BLOOD PRESSURE: 137 MMHG | DIASTOLIC BLOOD PRESSURE: 86 MMHG | BODY MASS INDEX: 38.32 KG/M2 | HEIGHT: 65 IN

## 2020-08-14 PROCEDURE — 99215 OFFICE O/P EST HI 40 MIN: CPT | Performed by: PSYCHIATRY & NEUROLOGY

## 2020-08-14 NOTE — PROGRESS NOTES
Aliajuan antonio Caldwell   Neurology followup    Subjective:   CC/HP  History was obtained from the patient. Additional history was obtained from her family. Patient still has difficulty walking. She has leg weakness. Patient's MRI brain and cervical spine showed active demyelinating plaques. She was treated with 4 days of IV steroids. Patient is noticed minimal improvement after that. Patient is here to discuss about disease modifying therapy. Detailed history:  Patient was diagnosed in early 2000's with multiple sclerosis after she had back surgery. This was confirmed by demyelinating plaques in the MRI brain and cervical spine. Patient and her family tell me that she had a lumbar puncture and the spinal fluid was also positive for MS. Patient has not seen a doctor in several years. She used to be followed at [facility] several years ago. Patient is not on any disease modifying therapy. They had talked to her about Aubagio or Gilenya but patient never went back and she was not started on medication. Currently patient is on baclofen for muscle spasms. Patient symptoms apparently have been worse since December 2019. She has increased difficulty walking. She also has some leg swelling. She has incontinence of urine. Symptom onset was gradual.  Symptoms are persistent. No clear aggravating or relieving factors to symptoms. Comorbidities include hypertension as well as hyperlipidemia    REVIEW OF SYSTEMS    Constitutional:  []   Chills   []  Fatigue   []  Fevers   []  Malaise   []  Weight loss     [x] Denies all of the above    Respiratory:   []  Cough    []  Shortness of breath         [x] Denies all of the above     Cardiovascular:   []  Chest pain    []  Exertional chest pressure/discomfort           [] Palpitations    []  Syncope     [x] Denies all of the above        Past Medical History:   Diagnosis Date    Hypertension     [facility]      History reviewed.  No to pin prick and light touch  VII: Facial strength and movements: intact and symmetric smile,cheek puffing and eyebrow elevation  VIII: Hearing:  Intact to finger rub bilaterally  IX: Palate  elevation is symmetric  XI: Shoulder shrug is intact  XII: Tongue movements are normal  Motor:  Muscle tone is been increased in the arms and legs. Strength is 4/5 in the upper extremities and 3/5 in the lower extremities. Sensory: Decreased to light touch in a nondermatomal fashion. Coordination: Impaired finger to Nose and Heel to Shin bilaterally but more on the right compared to the left side  . Reflexes:  DTR +2 and symmetric bilaterally in the upper extremities and 3+ in the lower extremities  Plantar response: Extensor bilaterally  Gait: Gait is significantly impaired  Romberg: Could not be tested  Vascular: No carotid bruit bilaterally       Data :  LABS:  General Labs:    CBC:   Lab Results   Component Value Date    WBC 5.4 12/23/2019    RBC 4.44 12/23/2019    HGB 12.8 12/23/2019    HCT 39.5 12/23/2019    MCV 88.9 12/23/2019    MCH 28.7 12/23/2019    MCHC 32.3 12/23/2019    RDW 14.6 12/23/2019     12/23/2019    MPV 7.8 12/23/2019     BMP:    Lab Results   Component Value Date     07/30/2020    K 3.0 07/30/2020    K 3.7 12/23/2019     07/30/2020    CO2 27 07/30/2020    BUN 36 07/30/2020    LABALBU 4.2 12/23/2019    CREATININE 1.2 07/30/2020    CALCIUM 9.1 07/30/2020    GFRAA 54 07/30/2020    GFRAA >60 07/06/2011    LABGLOM 45 07/30/2020    GLUCOSE 114 07/30/2020     RADIOLOGY REVIEW:  I have reviewed radiology image(s) and reports(s) of: MRI brain and cervical spine    Impression :  Multiple sclerosis with active demyelinating plaques in the brain and cervical spinal cord  MRI brain and cervical spine images were independently reviewed  Patient has finished her course of IV steroids  Paraparesis  Ataxia    Plan :  Discussed at length with patient and her family  They had questions about further treatment and these were discussed in detail  We discussed about different options and decided to start her on Tecfidera  Side effects were discussed. We will have to wait and see if her insurance covers this medication  She will continue using the walker  She will continue baclofen for muscle spasms  I will see her back in 2 months for follow-up  High complexity        Please note a portion of  this chart was generated using dragon dictation software. Although every effort was made to ensure the accuracy of this automated transcription, some errors in transcription may have occurred.

## 2020-11-24 ENCOUNTER — TELEPHONE (OUTPATIENT)
Dept: ENT CLINIC | Age: 70
End: 2020-11-24

## 2020-11-24 NOTE — TELEPHONE ENCOUNTER
Patient's daughter called Mara Saeed , she says that she helps her Mother with her Doctor appointments , and she needs to have some paperwork filled out    She is having some paperwork faxed to us     And she says that it is confirming her Mother's condition     I gave her the fax # [phone]  Please advise

## 2020-12-03 NOTE — TELEPHONE ENCOUNTER
LMOM cking status regarding  Patient's daughter's  FMLA  paperwork     Patient's daughter called, back,   Her Mother had an appt yesterday and could not come, because the paperwork that she requesting was not sent to who ever it needed to go to?   Do we know if we have received any paperwork   Please advise

## 2020-12-04 NOTE — TELEPHONE ENCOUNTER
Spoke to patient's daughter. We will get the paper work on Monday and the Dr Renzo Calzada will decide if he can fill it out once he reviews it. We will call daughter to let her know, and she will reschedule appointment for patient at that time.

## 2021-07-19 ENCOUNTER — TELEPHONE (OUTPATIENT)
Dept: NEUROLOGY | Age: 71
End: 2021-07-19

## 2021-07-19 NOTE — TELEPHONE ENCOUNTER
Called the pt to inquire about the pt's insurance.  Wanted to make sure that we had the correct info on file due to the PA for the medication was denied due to the coverage of the medication     The pt was not available at the time of me calling     LVM pertaining to the nature of me calling

## 2021-07-23 ENCOUNTER — TELEPHONE (OUTPATIENT)
Dept: NEUROLOGY | Age: 71
End: 2021-07-23

## 2021-07-23 NOTE — TELEPHONE ENCOUNTER
Rec'd call from Justice Woody @ [facility] asking for Tecfidera Starter Pack PA doses 120/240mg    Pt has presumably been taking Tecfidera for 11 months and should be taking 240mg bid. However we can-not verify this because pt is not returning our calls nor did she follow up as recommended after her initial appt. Advised Rafaela to cancel PA for starter pack.